# Patient Record
Sex: FEMALE | Race: WHITE | NOT HISPANIC OR LATINO | Employment: FULL TIME | URBAN - METROPOLITAN AREA
[De-identification: names, ages, dates, MRNs, and addresses within clinical notes are randomized per-mention and may not be internally consistent; named-entity substitution may affect disease eponyms.]

---

## 2017-12-27 ENCOUNTER — OCCUPATIONAL MEDICINE (OUTPATIENT)
Dept: URGENT CARE | Facility: CLINIC | Age: 37
End: 2017-12-27
Payer: COMMERCIAL

## 2017-12-27 ENCOUNTER — APPOINTMENT (OUTPATIENT)
Dept: RADIOLOGY | Facility: IMAGING CENTER | Age: 37
End: 2017-12-27
Attending: NURSE PRACTITIONER
Payer: COMMERCIAL

## 2017-12-27 VITALS
HEART RATE: 65 BPM | HEIGHT: 67 IN | RESPIRATION RATE: 16 BRPM | WEIGHT: 185 LBS | DIASTOLIC BLOOD PRESSURE: 82 MMHG | BODY MASS INDEX: 29.03 KG/M2 | OXYGEN SATURATION: 98 % | SYSTOLIC BLOOD PRESSURE: 116 MMHG | TEMPERATURE: 97.8 F

## 2017-12-27 DIAGNOSIS — S67.22XA CRUSHING INJURY OF LEFT HAND, INITIAL ENCOUNTER: ICD-10-CM

## 2017-12-27 DIAGNOSIS — Z02.1 PRE-EMPLOYMENT DRUG SCREENING: ICD-10-CM

## 2017-12-27 LAB
AMP AMPHETAMINE: NORMAL
COC COCAINE: NORMAL
INT CON NEG: NORMAL
INT CON POS: NORMAL
MET METHAMPHETAMINES: NORMAL
OPI OPIATES: NORMAL
PCP PHENCYCLIDINE: NORMAL
POC DRUG COMMENT 753798-OCCUPATIONAL HEALTH: NORMAL
THC: NORMAL

## 2017-12-27 PROCEDURE — 80305 DRUG TEST PRSMV DIR OPT OBS: CPT | Mod: 29 | Performed by: NURSE PRACTITIONER

## 2017-12-27 PROCEDURE — 99203 OFFICE O/P NEW LOW 30 MIN: CPT | Mod: 29 | Performed by: NURSE PRACTITIONER

## 2017-12-27 PROCEDURE — 73130 X-RAY EXAM OF HAND: CPT | Mod: TC,LT,29 | Performed by: NURSE PRACTITIONER

## 2017-12-27 ASSESSMENT — ENCOUNTER SYMPTOMS
RESPIRATORY NEGATIVE: 1
NEUROLOGICAL NEGATIVE: 1
CONSTITUTIONAL NEGATIVE: 1

## 2017-12-27 ASSESSMENT — PAIN SCALES - GENERAL: PAINLEVEL: 4=SLIGHT-MODERATE PAIN

## 2017-12-27 NOTE — LETTER
[facility]  [address], [address] [place] [address]  Phone:  [phone] - Fax:  [phone]   [facility] Progress Report and Disability Certification  Date of Service: 2017   No Show:  No  Date / Time of Next Visit: 2018   Claim Information   Patient Name: Virginia Guzman  Claim Number:     Employer:   [employer] Date of Injury: 2017     Insurer / TPA: Employers Insurance  ID / SSN: [id number]    Occupation:   Diagnosis: The encounter diagnosis was Crushing injury of left hand, initial encounter.    Medical Information   Related to Industrial Injury? Yes    Subjective Complaints:  DOI  - approx 1200 she got her smashed between boards and rail. She was wearing her gloves. No hx of same. Pain 5/10 and better with tylenol.      Objective Findings: TTP over second knuckle. Full ROM. Distal CMS intact    Pre-Existing Condition(s): Denies    Assessment:   Initial Visit    Status: Additional Care Required  Permanent Disability:No    Plan: Medication  Comments:tylenol     Diagnostics: X-ray  Comments:negative     Comments:       Disability Information   Status: Released to Restricted Duty    From:  2017  Through: 2018 Restrictions are: Temporary   Physical Restrictions   Sitting:    Standing:    Stooping:    Bending:      Squatting:    Walking:    Climbing:    Pushin hrs/day  Comments:left hand 2 # wt limit    Pullin hrs/day  Comments:left hand 2 # wt limit  Other:    Reaching Above Shoulder (L):   Reaching Above Shoulder (R):       Reaching Below Shoulder (L):    Reaching Below Shoulder (R):      Not to exceed Weight Limits   Carrying(hrs):   Weight Limit(lb):   Comments:left hand 2 # wt limit  Lifting(hrs):   Weight  Limit(lb):   Comments:left hand 2 # wt limit    Comments: 2# weight limit to left hand     Repetitive Actions   Hands: i.e. Fine Manipulations from Grasping:     Feet: i.e. Operating Foot Controls:        Driving / Operate Machinery:     Physician Name: QUINTEN Soliman Physician Signature: LIZY Eddy e-Signature: Dr. Kurt Guerra, Medical Director   Clinic Name / Location: [address], [address]  [place] [address] Clinic Phone Number: Dept: [phone]   Appointment Time: 2:30 Pm Visit Start Time: 2:57 PM   Check-In Time:  2:32 Pm Visit Discharge Time:  3:46pm   Original-Treating Physician or Chiropractor    Page 2-Insurer/TPA    Page 3-Employer    Page 4-Employee

## 2017-12-27 NOTE — LETTER
[facility]  [address] [place] [address]  Phone:  [phone] - Fax:  [phone]   [facility] Progress Report and Disability Certification  Date of Service: 2017   No Show:  No  Date / Time of Next Visit: 2018   Claim Information   Patient Name: Virginia Guzman  Claim Number:     Employer:   [employer] Date of Injury: 2017     Insurer / TPA: Employers Insurance  ID / SSN: [id number]    Occupation:   Diagnosis: The encounter diagnosis was Crushing injury of left hand, initial encounter.    Medical Information   Related to Industrial Injury? Yes    Subjective Complaints:  DOI  - approx 1200 she got her smashed between boards and rail. She was wearing her gloves. No hx of same. Pain 5/10 and better with tylenol.      Objective Findings: TTP over second knuckle. Full ROM. Distal CMS intact    Pre-Existing Condition(s): Denies    Assessment:   Initial Visit    Status: Discharged / Care Transfer  Permanent Disability:No    Plan: Medication  Comments:tylenol     Diagnostics: X-ray  Comments:negative     Comments:       Disability Information   Status: Released to Restricted Duty    From:  2017  Through: 2018 Restrictions are: Temporary   Physical Restrictions   Sitting:    Standing:    Stooping:    Bending:      Squatting:    Walking:    Climbing:    Pushin hrs/day  Comments:left hand 2 # wt limit    Pullin hrs/day  Comments:left hand 2 # wt limit  Other:    Reaching Above Shoulder (L):   Reaching Above Shoulder (R):       Reaching Below Shoulder (L):    Reaching Below Shoulder (R):      Not to exceed Weight Limits   Carrying(hrs):   Weight Limit(lb):   Comments:left hand 2 # wt limit  Lifting(hrs):   Weight  Limit(lb):   Comments:left hand 2 # wt limit    Comments: 2# weight limit to left hand     Repetitive Actions   Hands: i.e. Fine Manipulations from Grasping:     Feet: i.e. Operating Foot Controls:        Driving / Operate Machinery:     Physician Name: QUINTEN Soliman Physician Signature: LIZY Eddy e-Signature: Dr. Kurt Guerra, Medical Director   Clinic Name / Location: [address], [address]  [place] [address] Clinic Phone Number: Dept: [phone]   Appointment Time: 2:30 Pm Visit Start Time: 2:57 PM   Check-In Time:  2:32 Pm Visit Discharge Time: 1537   Original-Treating Physician or Chiropractor    Page 2-Insurer/TPA    Page 3-Employer    Page 4-Employee

## 2017-12-27 NOTE — LETTER
"EMPLOYEE’S CLAIM FOR COMPENSATION/ REPORT OF INITIAL TREATMENT  FORM C-4    EMPLOYEE’S CLAIM - PROVIDE ALL INFORMATION REQUESTED   First Name  Virginia Last Name  Thomas Birthdate                    1980                Sex  female Claim Number   Home Address  [address] Age  37 y.o. Height  1.702 m (5' 7\") Weight  83.9 kg (185 lb) N  [id number]   [facility] Zip  [address] Telephone  [phone] (home)    Mailing Address  [address] [facility] Zip  [address] Primary Language Spoken  English    Insurer   Third Party   Employers Insurance   Employee's Occupation (Job Title) When Injury or Occupational Disease Occurred      Employer's Name    [employer] Telephone      Employer Address   [address] Zip   [address]   Date of Injury  12/27/2017               Hour of Injury  12:30 PM Date Employer Notified  12/27/2017 Last Day of Work after Injury or Occupational Disease  12/27/2017 Supervisor to Whom Injury Reported  Deanna Sauceda   Address or Location of Accident (if applicable)  [[employer]]   What were you doing at the time of accident? (if applicable)  Loading Trash into Compactor    How did this injury or occupational disease occur? (Be specific an answer in detail. Use additional sheet if necessary)  Wooden Board Repelled from compactor,struck left hand crushing hand onto compactor   If you believe that you have an occupational disease, when did you first have knowledge of the disability and it relationship to your employment?  no Witnesses to the Accident  [place]      Nature of Injury or Occupational Disease  Workers' Compensation  Part(s) of Body Injured or Affected  Hand (L), ,     I certify that the above is true and correct to the best of my knowledge and that I have provided this information in order to obtain the benefits of [place]’s " [place] or [place] of [place]).  I hereby authorize any physician, chiropractor, surgeon, practitioner, or other person, any hospital, including [facility] or [facility], any medical service organization, any insurance company, or other institution or organization to release to each other, any medical or other information, including benefits paid or payable, pertinent to this injury or disease, except information relative to diagnosis, treatment and/or counseling for AIDS, psychological conditions, alcohol or controlled substances, for which I must give specific authorization.  A Photostat of this authorization shall be as valid as the original.     Date 12/27/2017   [facility]   Employee’s Signature   THIS REPORT MUST BE COMPLETED AND MAILED WITHIN 3 WORKING DAYS OF TREATMENT   Place  [facility]  Name of Facility  [facility]   Date  12/27/2017 Diagnosis  (S67.22XA) Crushing injury of left hand, initial encounter Is there evidence the injured employee was under the influence of alcohol and/or another controlled substance at the time of accident?   Hour  2:57 PM Description of Injury or Disease  The encounter diagnosis was Crushing injury of left hand, initial encounter.     Treatment  Ice and tylenol   Have you advised the patient to remain off work five days or more? No   X-Ray Findings  Negative   If Yes   From Date  To Date      From information given by the employee, together with medical evidence, can you directly connect this injury or occupational disease as job incurred?  Yes If No Full Duty  No Modified Duty  Yes   Is additional medical care by a physician indicated?  Yes If Modified Duty, Specify any Limitations / Restrictions  2 # weight limit left hand    Do you know of any previous injury or disease contributing to this condition or occupational disease?                      "       No   Date  12/27/2017 Print Doctor’s Name QUINTEN Soliman I certify the employer’s copy of  this form was mailed on:   Address  [address], [address] Insurer’s Use Only     [facility] [place] [facility] [place] [facility] [place] Zip  [address]    Provider’s Tax ID Number  [id number] Telephone  Dept: [phone]        martine-[email or web address] Anguiano   e-Signature: Dr. Kurt Guerra, Medical Director Degree  [employer]        ORIGINAL-TREATING PHYSICIAN OR CHIROPRACTOR    PAGE 2-INSURER/TPA    PAGE 3-EMPLOYER    PAGE 4-EMPLOYEE             Form C-4 (rev10/07)              BRIEF DESCRIPTION OF RIGHTS AND BENEFITS  (Pursuant to [place] 616C.050)    Notice of Injury or Occupational Disease (Incident Report Form C-1): If an injury or occupational disease (OD) arises out of and in the  course of employment, you must provide written notice to your employer as soon as practicable, but no later than 7 days after the accident or  OD. Your employer shall maintain a sufficient supply of the required forms.    Claim for Compensation (Form C-4): If medical treatment is sought, the form C-4 is available at the place of initial treatment. A completed  \"Claim for Compensation\" (Form C-4) must be filed within 90 days after an accident or OD. The treating physician or chiropractor must,  within 3 working days after treatment, complete and mail to the employer, the employer's insurer and third-party , the Claim for  Compensation.    Medical Treatment: If you require medical treatment for your on-the-job injury or OD, you may be required to select a physician or  chiropractor from a list provided by your workers’ compensation insurer, if it has contracted with an Organization for Managed Care (MCO) or  Preferred Provider Organization (PPO) or providers of health care. If your employer has not entered into a contract with an MCO or PPO, you  may select a physician or chiropractor from the Panel of Physicians and Chiropractors. Any medical costs " related to your industrial injury or  OD will be paid by your insurer.    Temporary Total Disability (TTD): If your doctor has certified that you are unable to work for a period of at least 5 consecutive days, or 5  cumulative days in a 20-day period, or places restrictions on you that your employer does not accommodate, you may be entitled to TTD  compensation.    Temporary Partial Disability (TPD): If the wage you receive upon reemployment is less than the compensation for TTD to which you are  entitled, the insurer may be required to pay you TPD compensation to make up the difference. TPD can only be paid for a maximum of 24  months.    Permanent Partial Disability (PPD): When your medical condition is stable and there is an indication of a PPD as a result of your injury or  OD, within 30 days, your insurer must arrange for an evaluation by a rating physician or chiropractor to determine the degree of your PPD. The  amount of your PPD award depends on the date of injury, the results of the PPD evaluation and your age and wage.    Permanent Total Disability (PTD): If you are medically certified by a treating physician or chiropractor as permanently and totally disabled  and have been granted a PTD status by your insurer, you are entitled to receive monthly benefits not to exceed 66 2/3% of your average  monthly wage. The amount of your PTD payments is subject to reduction if you previously received a PPD award.    Vocational Rehabilitation Services: You may be eligible for vocational rehabilitation services if you are unable to return to the job due to a  permanent physical impairment or permanent restrictions as a result of your injury or occupational disease.    Transportation and Per Pili Reimbursement: You may be eligible for travel expenses and per pili associated with medical treatment.    Reopening: You may be able to reopen your claim if your condition worsens after claim closure.    Appeal Process: If you  disagree with a written determination issued by the insurer or the insurer does not respond to your request, you may  appeal to the Department of Administration, , by following the instructions contained in your determination letter. You must  appeal the determination within 70 days from the date of the determination letter at [address], [address], [place], [place]  [address], or [address], [address], [place], [place] [address]. If you disagree with the  decision, you may appeal to the  Department of Administration, . You must file your appeal within 30 days from the date of the  decision  letter at [address], [address], [place], [place] [address], or [address], [address], [place], [place] [address]. If you  disagree with a decision of an , you may file a petition for judicial review with the District Court. You must do so within 30  days of the Appeal Officer’s decision. You may be represented by an  at your own expense or you may contact the [facility] for possible  representation.    [place]  for Injured Workers (NAIW): If you disagree with a  decision, you may request that NAIW represent you  without charge at an  Hearing. For information regarding denial of benefits, you may contact the [facility] at: [address], [address], [place] [address], [phone], or [address], [address], [place] [address], [phone]    To File a Complaint with the Division: If you wish to file a complaint with the  of the Division of Industrial Relations (DIR),  please contact the Workers’ Compensation Section, [address], [address], [place], [place] [address], telephone [phone], or  [address], [address], [place], [place] [address], telephone [phone].    For assistance with Workers’ Compensation  Issues: you may contact the Office of the Governor Consumer Health Assistance, [facility] [address], [address], [place] [address], Toll Free 1-505.704.9058, Web site: http://govcha.[facility].[place]., E-mail  [email or web address].                                                                                                                                                                                                                                   __________________________________________________________________                                                                   __12/27/2017_______________                Employee Name / Signature                                                                                                                                                       Date                                                                                                                                                                                                     D-2 (rev. 10/07)

## 2017-12-27 NOTE — PROGRESS NOTES
"Subjective:      Virginia Guzman is a 37 y.o. female who presents with Hand Pain (left hand pain since crush injury today )  Denies previous medical, surgical, or family history. Denies medications or allergies.      DOI 12/27 - approx 1200 she got her smashed between boards and rail. She was wearing her gloves. No hx of same. Pain 5/10 and better with tylenol.        HPI  Chief Complaint   Patient presents with   • Hand Pain     left hand pain since crush injury today    as above     Review of Systems   Constitutional: Negative.    HENT: Negative.    Respiratory: Negative.    Musculoskeletal:        Left hand pain    Skin: Negative.    Neurological: Negative.    All other systems reviewed and are negative.         Objective:     /82   Pulse 65   Temp 36.6 °C (97.8 °F)   Resp 16   Ht 1.702 m (5' 7\")   Wt 83.9 kg (185 lb)   SpO2 98%   BMI 28.98 kg/m²      Physical Exam   Constitutional: She is oriented to person, place, and time. She appears well-developed and well-nourished. No distress.   Neck: Normal range of motion.   Pulmonary/Chest: Effort normal. No respiratory distress.   Musculoskeletal:   See below for focused assessment    Neurological: She is alert and oriented to person, place, and time.   Skin: Skin is warm and dry. Capillary refill takes less than 2 seconds.   Psychiatric: She has a normal mood and affect.   Nursing note and vitals reviewed.      TTP over second knuckle. Full ROM. Distal CMS intact     Hand xray negative      Assessment/Plan:     1. Crushing injury of left hand, initial encounter  DX-HAND 3+ LEFT     Ice  Tylenol  Gentle ROM  Light duty per D 39  Follow up 1/2 - anticipate d'c mmi     Please make an appointment for follow up with your primary care provider. Return for any change in condition, worsening of symptoms, or any other concerns. Please go to the nearest emergency room for any shortness of breath or chest pain or for any of the emergent conditions we have discussed. " Patient states understanding to plan of care and discharge instructions.    Please note that this dictation was created using voice recognition software. I have worked with consultants from the vendor as well as technical experts from [facility] to optimize the interface. I have made every reasonable attempt to correct obvious errors, but I expect that there are errors of grammar and possibly content that I did not discover before finalizing the note.

## 2018-01-02 ENCOUNTER — NON-PROVIDER VISIT (OUTPATIENT)
Dept: OCCUPATIONAL MEDICINE | Facility: CLINIC | Age: 38
End: 2018-01-02
Payer: COMMERCIAL

## 2018-01-02 DIAGNOSIS — Z02.83 ENCOUNTER FOR DRUG SCREENING: ICD-10-CM

## 2018-01-02 PROCEDURE — 8911 PR MRO FEE: Performed by: INTERNAL MEDICINE

## 2018-01-16 NOTE — PROGRESS NOTES
Non-conclusive UDS follow up.  MRO confirmation fees need to be charged.  MRO report date 1/2/18

## 2018-03-06 ENCOUNTER — NON-PROVIDER VISIT (OUTPATIENT)
Dept: URGENT CARE | Facility: CLINIC | Age: 38
End: 2018-03-06

## 2018-03-06 ENCOUNTER — OFFICE VISIT (OUTPATIENT)
Dept: URGENT CARE | Facility: CLINIC | Age: 38
End: 2018-03-06

## 2018-03-06 DIAGNOSIS — Z29.89 NEED FOR ISOLATION: ICD-10-CM

## 2018-03-06 DIAGNOSIS — Z01.89 RESPIRATORY CLEARANCE EXAMINATION, ENCOUNTER FOR: ICD-10-CM

## 2018-03-06 PROCEDURE — 94375 RESPIRATORY FLOW VOLUME LOOP: CPT | Performed by: NURSE PRACTITIONER

## 2018-03-06 PROCEDURE — 94010 BREATHING CAPACITY TEST: CPT | Performed by: NURSE PRACTITIONER

## 2018-03-07 NOTE — PROGRESS NOTES
Virginia Guzman is a 38 y.o. female here for a non-provider visit for Mask Fit/ Respiratory Clearance    If abnormal was an in office provider notified today (if so, indicate provider)? Yes  Routed to PCP? No

## 2019-07-29 ENCOUNTER — OFFICE VISIT (OUTPATIENT)
Dept: URGENT CARE | Facility: PHYSICIAN GROUP | Age: 39
End: 2019-07-29

## 2019-07-29 VITALS
SYSTOLIC BLOOD PRESSURE: 116 MMHG | OXYGEN SATURATION: 100 % | RESPIRATION RATE: 18 BRPM | DIASTOLIC BLOOD PRESSURE: 74 MMHG | HEART RATE: 74 BPM | TEMPERATURE: 98.7 F

## 2019-07-29 DIAGNOSIS — S39.012A LOW BACK STRAIN, INITIAL ENCOUNTER: ICD-10-CM

## 2019-07-29 PROCEDURE — 99214 OFFICE O/P EST MOD 30 MIN: CPT | Performed by: PHYSICIAN ASSISTANT

## 2019-07-29 RX ORDER — PREDNISONE 10 MG/1
40 TABLET ORAL DAILY
Qty: 20 TAB | Refills: 0 | Status: SHIPPED | OUTPATIENT
Start: 2019-07-29 | End: 2019-08-03

## 2019-07-29 RX ORDER — METHOCARBAMOL 500 MG/1
500 TABLET, FILM COATED ORAL 3 TIMES DAILY PRN
Qty: 30 TAB | Refills: 0 | Status: SHIPPED | OUTPATIENT
Start: 2019-07-29 | End: 2019-10-17

## 2019-07-29 ASSESSMENT — ENCOUNTER SYMPTOMS
PARESTHESIAS: 0
TINGLING: 1
ABDOMINAL PAIN: 0
BOWEL INCONTINENCE: 0
NUMBNESS: 0
SENSORY CHANGE: 0
FOCAL WEAKNESS: 0
PARESIS: 0
BACK PAIN: 1

## 2019-07-29 NOTE — PROGRESS NOTES
Subjective:   Virginia Guzman is a 39 y.o. female who presents today with   Chief Complaint   Patient presents with   • Back Pain     After pulling weeds at home        Back Pain   This is a new problem. Episode onset: 3 days. The problem occurs constantly. The problem is unchanged. The pain is present in the lumbar spine. The quality of the pain is described as aching, cramping and shooting. The pain radiates to the left thigh. The pain is moderate. The symptoms are aggravated by bending, position and standing. Associated symptoms include tingling. Pertinent negatives include no abdominal pain, bladder incontinence, bowel incontinence, numbness, paresis or paresthesias. She has tried NSAIDs for the symptoms. The treatment provided mild relief.     Patient denies any recent trauma.  She does not have any history of back injury.  She does state that she was pulling weeds over the weekend and is when she started noticing her back pain.  PMH:  has no past medical history on file.  MEDS:   Current Outpatient Prescriptions:   •  methocarbamol (ROBAXIN) 500 MG Tab, Take 1 Tab by mouth 3 times a day as needed (muscle spasm)., Disp: 30 Tab, Rfl: 0  •  predniSONE (DELTASONE) 10 MG Tab, Take 4 Tabs by mouth every day for 5 days., Disp: 20 Tab, Rfl: 0  ALLERGIES: No Known Allergies  SURGHX: History reviewed. No pertinent surgical history.  SOCHX:  reports that she has been smoking.  She has never used smokeless tobacco.  FH: Reviewed with patient, not pertinent to this visit.       Review of Systems   Gastrointestinal: Negative for abdominal pain and bowel incontinence.   Genitourinary: Negative for bladder incontinence.   Musculoskeletal: Positive for back pain.   Neurological: Positive for tingling. Negative for sensory change, focal weakness, numbness and paresthesias.   All other systems reviewed and are negative.       Objective:   /74   Pulse 74   Temp 37.1 °C (98.7 °F) (Temporal)   Resp 18   SpO2 100%      Physical Exam   Constitutional: She appears well-developed and well-nourished. No distress.   HENT:   Head: Normocephalic and atraumatic.   Right Ear: Hearing normal.   Left Ear: Hearing normal.   Eyes: Pupils are equal, round, and reactive to light.   Cardiovascular: Normal rate, regular rhythm and normal heart sounds.    Pulmonary/Chest: Effort normal and breath sounds normal.   Musculoskeletal:        Lumbar back: She exhibits decreased range of motion, tenderness, bony tenderness and spasm.        Back:    Patient has tenderness to palpation of the left lumbar region.  No point bony tenderness along the spine but just lateral to the spine there is tenderness and tightness in the paraspinous muscles   Neurological: She is alert. Coordination normal.   Skin: Skin is warm and dry.   Psychiatric: She has a normal mood and affect.   Nursing note and vitals reviewed.    Patient can ambulate upon examination but is unable to straighten her back out secondary to pain.  Patient has normal distal sensation and is neurovascularly intact.    Assessment/Plan:   Assessment    1. Low back strain, initial encounter  - methocarbamol (ROBAXIN) 500 MG Tab; Take 1 Tab by mouth 3 times a day as needed (muscle spasm).  Dispense: 30 Tab; Refill: 0  - predniSONE (DELTASONE) 10 MG Tab; Take 4 Tabs by mouth every day for 5 days.  Dispense: 20 Tab; Refill: 0  Patient is requesting a note for work. Patient to only take muscle relaxer as needed and to not operate any vehicles after taking the medication.  Patient should use heat or ice on the area and gentle massages.  Differential diagnosis, natural history, supportive care, and indications for immediate follow-up discussed.   Patient given instructions and understanding of medications and treatment.    If not improving in 3-5 days, F/U with PCP or return to  if symptoms worsen.    Patient agreeable to plan.      Please note that this dictation was created using voice recognition  software. I have made every reasonable attempt to correct obvious errors, but I expect that there are errors of grammar and possibly content that I did not discover before finalizing the note.    Ranjan Campos PA-C

## 2019-07-29 NOTE — LETTER
July 29, 2019         Patient: Virginia Guzman   YOB: 1980   Date of Visit: 7/29/2019           To Whom it May Concern:    Virginia Guzman was seen in my clinic on 7/29/2019. She can return to work 8/5/19.  Please excuse her recent absence.  If you have any questions or concerns, please don't hesitate to call.        Sincerely,           Ranjan Campos P.A.-C.  Electronically Signed

## 2019-08-05 ENCOUNTER — OFFICE VISIT (OUTPATIENT)
Dept: URGENT CARE | Facility: PHYSICIAN GROUP | Age: 39
End: 2019-08-05

## 2019-08-05 VITALS — HEART RATE: 80 BPM | RESPIRATION RATE: 16 BRPM | TEMPERATURE: 98.4 F | OXYGEN SATURATION: 97 %

## 2019-08-05 DIAGNOSIS — S39.012A ACUTE MYOFASCIAL STRAIN OF LUMBAR REGION, INITIAL ENCOUNTER: ICD-10-CM

## 2019-08-05 PROCEDURE — 99214 OFFICE O/P EST MOD 30 MIN: CPT | Performed by: PHYSICIAN ASSISTANT

## 2019-08-05 RX ORDER — PREDNISONE 10 MG/1
TABLET ORAL
Qty: 1 QUANTITY SUFFICIENT | Refills: 0 | Status: SHIPPED | OUTPATIENT
Start: 2019-08-05 | End: 2019-10-17

## 2019-08-05 RX ORDER — CYCLOBENZAPRINE HCL 10 MG
10 TABLET ORAL 3 TIMES DAILY PRN
Qty: 30 TAB | Refills: 0 | Status: SHIPPED | OUTPATIENT
Start: 2019-08-05 | End: 2019-10-17

## 2019-08-05 ASSESSMENT — PAIN SCALES - GENERAL: PAINLEVEL: 4=SLIGHT-MODERATE PAIN

## 2019-08-05 NOTE — LETTER
August 5, 2019         Patient: Virginia Guzman   YOB: 1980   Date of Visit: 8/5/2019           To Whom it May Concern:    Virginia Guzman was seen in my clinic on 8/5/2019. She may return to work on 8/15/19.    If you have any questions or concerns, please don't hesitate to call.        Sincerely,           Dayanna Saldivar P.A.-C.  Electronically Signed

## 2019-08-05 NOTE — PROGRESS NOTES
"Chief Complaint   Patient presents with   • Back Pain     follow up from last week visit       HISTORY OF PRESENT ILLNESS: Patient is a 39 y.o. female who presents today for the following:    Patient comes in with her mother for evaluation of back pain.  She was seen for the same 7/29/2019 after pulling weeds.  She was given prednisone and methocarbamol.  She states she was starting to feel a lot better with the prednisone but only had 4 days worth.  Her pain returned after stopping the prednisone.  She complains of \"an icy hot pain\" down the posterior aspect of her left buttock and left lower extremity, stopping at the left foot.  Patient continues to deny saddle anesthesia, bowel/bladder incontinence, and extremity weakness.  She is having a difficult time sleeping due to the pain.  She has been taking a hot shower which seems to help and then icing after that.  She feels that the methocarbamol has not been helping.  She has been alternating ibuprofen and acetaminophen every 4 hours.  She works at [employer] and states she needs a note to miss work.  She appears frustrated because she does not yet have insurance despite being there for over a year.  She has an alternating schedule and is asking to go back to work 8/15.  Patient understands that [facility] paperwork will not be filled out in the urgent care.  She is requesting a referral to physical therapy as well.    There are no active problems to display for this patient.      Allergies:Patient has no known allergies.    Current Outpatient Medications Ordered in Epic   Medication Sig Dispense Refill   • predniSONE (DELTASONE) 10 MG Tab 50 mg x 1 day; 40 mg x 2 days; 30 mg x 2 days; 20 mg x 1 day; 10 mg x 1 day 1 Quantity Sufficient 0   • cyclobenzaprine (FLEXERIL) 10 MG Tab Take 1 Tab by mouth 3 times a day as needed for Mild Pain or Moderate Pain. 30 Tab 0   • methocarbamol (ROBAXIN) 500 MG Tab Take 1 Tab by mouth 3 times a day as needed (muscle spasm). (Patient not " taking: Reported on 8/5/2019) 30 Tab 0     No current Epic-ordered facility-administered medications on file.        No past medical history on file.    Social History     Tobacco Use   • Smoking status: Current Every Day Smoker   • Smokeless tobacco: Never Used   Substance Use Topics   • Alcohol use: Not on file   • Drug use: Not on file       No family status information on file.   No family history on file.    Review of Systems:    Constitutional ROS: No unexpected change in weight, No weakness, No fatigue  Eye ROS: No recent significant change in vision, No eye pain, redness, discharge  Ear ROS: No drainage, No tinnitus or vertigo, No recent change in hearing  Mouth/Throat ROS: No teeth or gum problems, No bleeding gums, No tongue complaints  Neck ROS: No swollen glands, No significant pain in neck  Pulmonary ROS: No chronic cough, sputum, or hemoptysis, No dyspnea on exertion, No wheezing  Cardiovascular ROS: No diaphoresis, No edema, No palpitations  Gastrointestinal ROS: No change in bowel habits, No significant change in appetite, No nausea, vomiting, diarrhea, or constipation  Musculoskeletal/Extremities ROS: Positive for back pain.  Hematologic/Lymphatic ROS: No chills, No night sweats, No weight loss  Skin/Integumentary ROS: No edema, No evidence of rash, No itching      Exam:  Pulse 80   Temp 36.9 °C (98.4 °F) (Temporal)   Resp 16   SpO2 97%   General: Well developed, well nourished. No distress.  Pulmonary: Unlabored respiratory effort.  Back: Decreased range of motion in all planes due to pain.  Patient is sitting upright, very stiffly.  No localized tenderness noted but patient points to the left lumbar region.  Extremities: No motor or sensory deficit noted.  Prepatellar DTRs are strong and equal bilaterally.  Strong plantar flexion/dorsiflexion bilaterally.  Neurologic: Grossly nonfocal. No facial asymmetry noted.  Skin: Warm, dry, good turgor. No rashes in visible areas.   Psych: Normal mood.  Alert and oriented x3. Judgment and insight is normal.    Assessment/Plan:  We will repeat one course of steroids.  Stop methocarbamol and start cycle Benzapril.  Discussed appropriate dosing of ibuprofen, acetaminophen, and adding heat/ice and over-the-counter muscle rubs.  Do not use any anti-inflammatories with prednisone.  Referring patient to physical therapy per patient request.  Discussed red flags and ER precautions.  1. Acute myofascial strain of lumbar region, initial encounter  predniSONE (DELTASONE) 10 MG Tab    REFERRAL TO PHYSICAL THERAPY Reason for Therapy: Eval/Treat/Report    cyclobenzaprine (FLEXERIL) 10 MG Tab

## 2019-10-17 ENCOUNTER — APPOINTMENT (OUTPATIENT)
Dept: RADIOLOGY | Facility: IMAGING CENTER | Age: 39
End: 2019-10-17
Attending: FAMILY MEDICINE

## 2019-10-17 ENCOUNTER — OCCUPATIONAL MEDICINE (OUTPATIENT)
Dept: URGENT CARE | Facility: PHYSICIAN GROUP | Age: 39
End: 2019-10-17

## 2019-10-17 VITALS
WEIGHT: 193 LBS | DIASTOLIC BLOOD PRESSURE: 82 MMHG | SYSTOLIC BLOOD PRESSURE: 120 MMHG | HEART RATE: 93 BPM | HEIGHT: 67 IN | BODY MASS INDEX: 30.29 KG/M2 | RESPIRATION RATE: 16 BRPM | OXYGEN SATURATION: 98 % | TEMPERATURE: 98.3 F

## 2019-10-17 DIAGNOSIS — Z02.1 PHYSICAL EXAM, PRE-EMPLOYMENT: ICD-10-CM

## 2019-10-17 DIAGNOSIS — Z02.1 PRE-EMPLOYMENT DRUG SCREENING: ICD-10-CM

## 2019-10-17 DIAGNOSIS — Z02.1 PHYSICAL EXAM, PRE-EMPLOYMENT: Primary | ICD-10-CM

## 2019-10-17 LAB
AMP AMPHETAMINE: NORMAL
COC COCAINE: NORMAL
INT CON NEG: NORMAL
INT CON POS: NORMAL
MET METHAMPHETAMINES: NORMAL
OPI OPIATES: NORMAL
PCP PHENCYCLIDINE: NORMAL
POC DRUG COMMENT 753798-OCCUPATIONAL HEALTH: NEGATIVE
THC: NORMAL

## 2019-10-17 PROCEDURE — 8915 PR COMPREHENSIVE PHYSICAL: Performed by: FAMILY MEDICINE

## 2019-10-17 PROCEDURE — 71045 X-RAY EXAM CHEST 1 VIEW: CPT | Mod: TC,FY | Performed by: FAMILY MEDICINE

## 2019-10-17 PROCEDURE — 80305 DRUG TEST PRSMV DIR OPT OBS: CPT | Performed by: FAMILY MEDICINE

## 2019-10-17 PROCEDURE — 92552 PURE TONE AUDIOMETRY AIR: CPT | Performed by: FAMILY MEDICINE

## 2019-10-17 NOTE — PROGRESS NOTES
Chief Complaint:    Chief Complaint   Patient presents with   • Employment Physical     [employer] pre-employment physical/audio clearance       History of Present Illness:    Here for pre-employment exam for [employer] as a .      Review of Systems:    Constitutional: Negative for fever, chills, and diaphoresis.   Eyes: Negative for change in vision, photophobia, pain, redness, and discharge.  ENT: Negative for ear pain, ear discharge, hearing loss, tinnitus, nasal congestion, nosebleeds, and sore throat.    Respiratory: Negative for cough, hemoptysis, sputum production, shortness of breath, wheezing, and stridor.    Cardiovascular: Negative for chest pain, palpitations, orthopnea, claudication, leg swelling, and PND.   Gastrointestinal: Negative for abdominal pain, nausea, vomiting, diarrhea, constipation, blood in stool, and melena.   Genitourinary: Negative for dysuria, urinary urgency, urinary frequency, hematuria, and flank pain.   Musculoskeletal: Negative for myalgias, joint pain, neck pain, and back pain.   Skin: Negative for rash and itching.   Neurological: Negative for dizziness, tingling, tremors, sensory change, speech change, focal weakness, seizures, loss of consciousness, and headaches.   Endo: Negative for polydipsia.   Heme: Does not bruise/bleed easily.   Psychiatric/Behavioral: Negative for depression, suicidal ideas, hallucinations, memory loss and substance abuse. The patient is not nervous/anxious and does not have insomnia.        Past Medical History:    History reviewed. No pertinent past medical history.    Past Surgical History:    Past Surgical History:   Procedure Laterality Date   • PRIMARY C SECTION       Social History:    Social History     Socioeconomic History   • Marital status: Single     Spouse name: Not on file   • Number of children: Not on file   • Years of education: Not on file   • Highest education level: Not on file   Occupational History   • Not on file   Social Needs  "  • Financial resource strain: Not on file   • Food insecurity:     Worry: Not on file     Inability: Not on file   • Transportation needs:     Medical: Not on file     Non-medical: Not on file   Tobacco Use   • Smoking status: Current Every Day Smoker   • Smokeless tobacco: Never Used   Substance and Sexual Activity   • Alcohol use: Not on file   • Drug use: Not on file   • Sexual activity: Not on file   Lifestyle   • Physical activity:     Days per week: Not on file     Minutes per session: Not on file   • Stress: Not on file   Relationships   • Social connections:     Talks on phone: Not on file     Gets together: Not on file     Attends Jewish service: Not on file     Active member of club or organization: Not on file     Attends meetings of clubs or organizations: Not on file     Relationship status: Not on file   • Intimate partner violence:     Fear of current or ex partner: Not on file     Emotionally abused: Not on file     Physically abused: Not on file     Forced sexual activity: Not on file   Other Topics Concern   • Not on file   Social History Narrative   • Not on file     Family History:    History reviewed. No pertinent family history.    Medications:    No current outpatient medications on file prior to visit.     No current facility-administered medications on file prior to visit.      Allergies:    No Known Allergies      Vitals:    Vitals:    10/17/19 1315   BP: 120/82   Pulse: 93   Resp: 16   Temp: 36.8 °C (98.3 °F)   SpO2: 98%   Weight: 87.5 kg (193 lb)   Height: 1.702 m (5' 7\")     Vision: 20/20 each eye, uncorrected.      Physical Exam:    Constitutional: Vital signs reviewed. Appears well-developed and well-nourished. No acute distress.   Eyes: Sclera white, conjunctivae clear. PERRLA.  ENT: External ears normal. External auditory canals normal without discharge. TMs translucent and non-bulging. Hearing grossly normal. Nasal mucosa pink. Lips/teeth are normal. Oral mucosa pink and moist. " Posterior pharynx: WNL.  Neck: Neck supple.   Cardiovascular: Regular rate and rhythm. No murmur. No edema. No varicosities. Peripheral pulses 2+.  Pulmonary/Chest: Respirations non-labored. Clear to auscultation bilaterally.  Abdomen: Bowel sounds are normal active. Soft, non-distended, and non-tender to palpation. No hepatosplenomegaly.   Lymph: Cervical nodes without tenderness or enlargement.  Musculoskeletal: Normal gait. Normal range of motion. No tenderness to palpation. No muscular atrophy or weakness.  Neurological: Alert and oriented to person, place, and time. CN 2-12 intact. Muscle tone normal. Coordination normal. Light touch and sensation normal. Reflexes 2+.  Skin: No rashes or lesions. Warm, dry, normal turgor.  Psychiatric: Normal mood and affect. Behavior is normal. Judgment and thought content normal.       Diagnostics:    DX-CHEST-LIMITED (1 VIEW)   Order: [id number]   Status:  Final result   Visible to patient:  No (Not Released) Next appt:  None Dx:  Physical exam, pre-employment   Details     Reading Physician Reading Date Result Priority   Seven Huffman M.D. 10/17/2019 Urgent Care      Narrative       10/17/2019 1:36 PM    HISTORY/REASON FOR EXAM:  [employer] pre-employment physical.  Preemployment physical examination    TECHNIQUE/EXAM DESCRIPTION AND NUMBER OF VIEWS:  Single AP view of the chest.    COMPARISON:  None    FINDINGS:  The lungs are clear.    Cardiac Silhouette: normal in size.    Pleura: There are no pleural effusion or pneumothoraces.    Osseous structures: No significant bony abnormality is present.      Impression       Negative single view of the chest.             Audiogram: mild hearing loss right ear at high frequencies, o/w WNL.      Assessment / Plan:    1. Physical exam, pre-employment  - DX-CHEST-LIMITED (1 VIEW); Future      Patient appears to be in a state of good health and is physically able to perform essential functions of the job title above without  accommodations.    Employee is able to perform the essential functions of job as it pertains to the audiogram with accomodations - advised to wear hearing protection around loud noises.    Form completed.

## 2019-11-21 ENCOUNTER — OCCUPATIONAL MEDICINE (OUTPATIENT)
Dept: URGENT CARE | Facility: PHYSICIAN GROUP | Age: 39
End: 2019-11-21
Payer: COMMERCIAL

## 2019-11-21 VITALS
WEIGHT: 195.8 LBS | HEART RATE: 76 BPM | BODY MASS INDEX: 30.73 KG/M2 | TEMPERATURE: 98.1 F | OXYGEN SATURATION: 98 % | RESPIRATION RATE: 16 BRPM | DIASTOLIC BLOOD PRESSURE: 72 MMHG | HEIGHT: 67 IN | SYSTOLIC BLOOD PRESSURE: 110 MMHG

## 2019-11-21 DIAGNOSIS — S05.91XA RIGHT EYE INJURY, INITIAL ENCOUNTER: ICD-10-CM

## 2019-11-21 DIAGNOSIS — H57.8A9 SENSATION OF FOREIGN BODY IN EYE: ICD-10-CM

## 2019-11-21 PROCEDURE — 99214 OFFICE O/P EST MOD 30 MIN: CPT | Mod: 29 | Performed by: NURSE PRACTITIONER

## 2019-11-21 RX ORDER — ERYTHROMYCIN 5 MG/G
1 OINTMENT OPHTHALMIC 4 TIMES DAILY
Qty: 1 TUBE | Refills: 0 | Status: SHIPPED | OUTPATIENT
Start: 2019-11-21 | End: 2019-11-28

## 2019-11-21 ASSESSMENT — ENCOUNTER SYMPTOMS
EYE PAIN: 1
BLURRED VISION: 0
DOUBLE VISION: 0
HEADACHES: 0
DIZZINESS: 0

## 2019-11-21 ASSESSMENT — VISUAL ACUITY: OU: 1

## 2019-11-21 NOTE — LETTER
"   [facility] [place] [place] [address]  Phone:  [phone] - Fax:  [phone]   [facility] Progress Report and Disability Certification  Date of Service: 11/21/2019   No Show:  No  Date / Time of Next Visit: 11/22/2019   Claim Information   Patient Name: Virginia Guzman  Claim Number:     Employer: [employer]  Date of Injury: 11/21/2019     Insurer / TPA: Gonzalo Messina [facility]  ID / SSN:     Occupation: PT  Diagnosis: Diagnoses of Right eye injury, initial encounter and Sensation of foreign body in eye were pertinent to this visit.    Medical Information   Related to Industrial Injury? Yes    Subjective Complaints:  DOI: 11/21/2019. Was cleaning shavings from a  when she got down form the platform someone was using a blower to clean the area. A cloud of wood dust, poly, dirt went in to her  face. She felt something was in her right eye. She rinsed her eyes and face, eye wash station helped some. Feels like top of her eyelid is scratched. \"Eyeball feels fine\".  Stings with blinking. No pain, more of an eye discomfort. No visual changes. No contact use, wears glasses for reading. No second job.    Objective Findings: Eyes:      General: Lids are normal. Lids are everted, no foreign bodies appreciated. Vision grossly intact.      Extraocular Movements: Extraocular movements intact.      Right eye: Normal extraocular motion and no nystagmus.      Conjunctiva/sclera: Conjunctivae normal.      Right eye: Right conjunctiva is not injected. No chemosis, exudate or hemorrhage.     Pupils: Pupils are equal, round, and reactive to light.      Right eye: No corneal abrasion or fluorescein uptake.      Comments: Flushed with normal saline. No fluorescein uptake noted with woods lamp exam.         Pre-Existing Condition(s): None known.   Assessment:   Initial Visit    Status: Additional Care Required  Permanent Disability:No    Plan: Medication    "   Diagnostics:      Comments:       Disability Information   Status: Released to Full Duty    From:  11/21/2019  Through: 11/22/2019 Restrictions are:     Physical Restrictions   Sitting:    Standing:    Stooping:    Bending:      Squatting:    Walking:    Climbing:    Pushing:      Pulling:    Other:    Reaching Above Shoulder (L):   Reaching Above Shoulder (R):       Reaching Below Shoulder (L):    Reaching Below Shoulder (R):      Not to exceed Weight Limits   Carrying(hrs):   Weight Limit(lb):   Lifting(hrs):   Weight  Limit(lb):     Comments: - erythromycin 5 MG/GM Ointment; Place 1 Application in right eye 4 times a day for 7 days.  Dispense: 1 Tube; Refill: 0  -Eye irrigation, eye wash station 5 minutes.   -Follow up in 24 hours.  -OTC ibuprofen  -Emergently for eye pain or vision changes.     Repetitive Actions   Hands: i.e. Fine Manipulations from Grasping:     Feet: i.e. Operating Foot Controls:     Driving / Operate Machinery:     Physician Name: DEANNE Munoz Physician Signature: IMTIAZ Edward e-Signature: Dr. Craig Ayers, Medical Director   Clinic Name / Location: [address] [address] Clinic Phone Number: Dept: [phone]   Appointment Time: 3:00 Pm Visit Start Time: 4:01 PM   Check-In Time:  3:03 Pm Visit Discharge Time: 5:20 PM   Original-Treating Physician or Chiropractor    Page 2-Insurer/TPA    Page 3-Employer    Page 4-Employee

## 2019-11-21 NOTE — LETTER
"EMPLOYEE’S CLAIM FOR COMPENSATION/ REPORT OF INITIAL TREATMENT  FORM C-4    EMPLOYEE’S CLAIM - PROVIDE ALL INFORMATION REQUESTED   First Name  Virginia Last Name  Thomas Birthdate                    1980                Sex  female Claim Number   Home Address  [address]  Age  39 y.o. 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 (if applicable)  Cleaning     How did this injury or occupational disease occur? (Be specific an answer in detail. Use additional sheet if necessary)  Dust was blown into my eye    If you believe that you have an occupational disease, when did you first have knowledge of the disability and it relationship to your employment?  N/A Witnesses to the Accident  N/A      Nature of Injury or Occupational Disease  Vision Loss  Part(s) of Body Injured or Affected  Eye (R), N/A, N/A    I certify that the above is true and correct to the best of my knowledge and that I have provided this information in order to obtain the benefits of [place] inclusive or Chapter " 617 of [place]RS).  I hereby authorize any physician, chiropractor, surgeon, practitioner, or other person, any hospital, including [facility] or [facility], any medical service organization, any insurance company, or other institution or organization to release to each other, any medical or other information, including benefits paid or payable, pertinent to this injury or disease, except information relative to diagnosis, treatment and/or counseling for AIDS, psychological conditions, alcohol or controlled substances, for which I must give specific authorization.  A Photostat of this authorization shall be as valid as the original.     Date  11/21/2019   [facility]   Employee’s Signature   THIS REPORT MUST BE COMPLETED AND MAILED WITHIN 3 WORKING DAYS OF TREATMENT   Place  [facility]  Name of Facility  [place]   Date  11/21/2019 Diagnosis  (S05.91XA) Right eye injury, initial encounter  (H57.9) Sensation of foreign body in eye Is there evidence the injured employee was under the influence of alcohol and/or another controlled substance at the time of accident?   Hour  4:01 PM Description of Injury or Disease  Diagnoses of Right eye injury, initial encounter and Sensation of foreign body in eye were pertinent to this visit. No   Treatment  Eye irrigation.  Woods lamp exam.  Erythromycin eye ointment. OTC ibuprofen for pain.  Follow up in 24 hours.         Have you advised the patient to remain off work five days or more? No   X-Ray Findings      If Yes   From Date  To Date      From information given by the employee, together with medical evidence, can you directly connect this injury or occupational disease as job incurred?  Yes If No Full Duty Yes Modified Duty      Is additional medical care by a physician indicated?  Yes If Modified Duty, Specify any Limitations / Restrictions      Do you know of any previous injury or disease contributing to this condition or  "occupational disease?                            No   Date  11/21/2019 Print Doctor’s Name DEANNE Munoz I certify the employer’s copy of  this form was mailed on:   Address  [address] Insurer’s Use Only     [facility] Zip  [address]    Provider’s Tax ID Number  [id number] Telephone  Dept: [phone]        [email or web address] Vanegas   e-Signature: Dr. Craig Ayers,   Medical Director Degree  MD BUTLER-TREATING PHYSICIAN OR CHIROPRACTOR    PAGE 2-INSURER/TPA    PAGE 3-EMPLOYER    PAGE 4-EMPLOYEE             Form C-4 (rev.10/07)              BRIEF DESCRIPTION OF RIGHTS AND BENEFITS  (Pursuant to [place]RS 616C.050)    Notice of Injury or Occupational Disease (Incident Report Form C-1): If an injury or occupational disease (OD) arises out of and in the course of employment, you must provide written notice to your employer as soon as practicable, but no later than 7 days after the accident or OD. Your employer shall maintain a sufficient supply of the required forms.    Claim for Compensation (Form C-4): If medical treatment is sought, the form C-4 is available at the place of initial treatment. A completed \"Claim for Compensation\" (Form C-4) must be filed within 90 days after an accident or OD. The treating physician or chiropractor must, within 3 working days after treatment, complete and mail to the employer, the employer's insurer and third-party , the Claim for Compensation.    Medical Treatment: If you require medical treatment for your on-the-job injury or OD, you may be required to select a physician or chiropractor from a list provided by your workers’ compensation insurer, if it has contracted with an Organization for Managed Care (MCO) or Preferred Provider Organization (PPO) or providers of health care. If your employer has not entered into a contract with an MCO or PPO, you may select a physician or chiropractor from the Panel of Physicians and " Chiropractors. Any medical costs related to your industrial injury or OD will be paid by your insurer.    Temporary Total Disability (TTD): If your doctor has certified that you are unable to work for a period of at least 5 consecutive days, or 5 cumulative days in a 20-day period, or places restrictions on you that your employer does not accommodate, you may be entitled to TTD compensation.    Temporary Partial Disability (TPD): If the wage you receive upon reemployment is less than the compensation for TTD to which you are entitled, the insurer may be required to pay you TPD compensation to make up the difference. TPD can only be paid for a maximum of 24 months.    Permanent Partial Disability (PPD): When your medical condition is stable and there is an indication of a PPD as a result of your injury or OD, within 30 days, your insurer must arrange for an evaluation by a rating physician or chiropractor to determine the degree of your PPD. The amount of your PPD award depends on the date of injury, the results of the PPD evaluation and your age and wage.    Permanent Total Disability (PTD): If you are medically certified by a treating physician or chiropractor as permanently and totally disabled and have been granted a PTD status by your insurer, you are entitled to receive monthly benefits not to exceed 66 2/3% of your average monthly wage. The amount of your PTD payments is subject to reduction if you previously received a PPD award.    Vocational Rehabilitation Services: You may be eligible for vocational rehabilitation services if you are unable to return to the job due to a permanent physical impairment or permanent restrictions as a result of your injury or occupational disease.    Transportation and Per Pili Reimbursement: You may be eligible for travel expenses and per pili associated with medical treatment.    Reopening: You may be able to reopen your claim if your condition worsens after claim  closure.    Appeal Process: If you disagree with a written determination issued by the insurer or the insurer does not respond to your request, you may appeal to the Department of Administration, , by following the instructions contained in your determination letter. You must appeal the determination within 70 days from the date of the determination letter at [address], [address], [place], [place] [address], or [address], [address], [place], [place] [address]. If you disagree with the  decision, you may appeal to the Department of Administration, . You must file your appeal within 30 days from the date of the  decision letter at [address], [address], [place], [place] [address], or [address], [facility] [address], [place], [place] [address]. If you disagree with a decision of an , you may file a petition for judicial review with the District Court. You must do so within 30 days of the Appeal Officer’s decision. You may be represented by an  at your own expense or you may contact the [facility] for possible representation.    [place]  for Injured Workers (NAIW): If you disagree with a  decision, you may request that NAIW represent you without charge at an  Hearing. For information regarding denial of benefits, you may contact the [facility] at: [address], [address], [place] [address], [phone], or [address], [address], [place] [address], [phone]    To File a Complaint with the Division: If you wish to file a complaint with the  of the Division of Industrial Relations (DIR),  please contact the Workers’ Compensation Section, [address], [place], [place] [address], telephone [phone], or [address], [place], [place] [address], telephone [phone].    For assistance with Workers’  Compensation Issues: You may contact the Office of the Governor Consumer Health Assistance, [facility], [address], [place] [address], Toll Free 1-619.424.9638, Web site: http://govcha.Critical access hospital.nv., E-mail [email or web address].                   __________________________________________________________________                                                     __11/21/2019_______        Employee Name / Signature                                                                                                                                              Date                                                                                                                                                                                                     D-2 (rev. 06/18)

## 2019-11-22 ENCOUNTER — OCCUPATIONAL MEDICINE (OUTPATIENT)
Dept: URGENT CARE | Facility: PHYSICIAN GROUP | Age: 39
End: 2019-11-22
Payer: COMMERCIAL

## 2019-11-22 VITALS
TEMPERATURE: 98.1 F | HEART RATE: 78 BPM | WEIGHT: 195 LBS | OXYGEN SATURATION: 99 % | HEIGHT: 67 IN | DIASTOLIC BLOOD PRESSURE: 80 MMHG | SYSTOLIC BLOOD PRESSURE: 120 MMHG | BODY MASS INDEX: 30.61 KG/M2 | RESPIRATION RATE: 16 BRPM

## 2019-11-22 DIAGNOSIS — S05.01XD ABRASION OF RIGHT CORNEA, SUBSEQUENT ENCOUNTER: ICD-10-CM

## 2019-11-22 PROCEDURE — 99213 OFFICE O/P EST LOW 20 MIN: CPT | Mod: 29 | Performed by: NURSE PRACTITIONER

## 2019-11-22 ASSESSMENT — ENCOUNTER SYMPTOMS
FOREIGN BODY SENSATION: 0
EYE DISCHARGE: 0
VOMITING: 0
BLURRED VISION: 0
NAUSEA: 0
EYE REDNESS: 0
EYE PAIN: 1
PHOTOPHOBIA: 0

## 2019-11-22 NOTE — LETTER
[address] [place] [address]  Phone:  [phone] - Fax:  [phone]   [facility] Progress Report and Disability Certification  Date of Service: 11/22/2019   No Show:  No  Date / Time of Next Visit:     Claim Information   Patient Name: Virginia Guzman  Claim Number:     Employer: [employer]  Date of Injury: 11/21/2019     Insurer / TPA: [employer] [facility] [facility]  ID / SSN:     Occupation: PT  Diagnosis: The encounter diagnosis was Abrasion of right cornea, subsequent encounter.    Medical Information   Related to Industrial Injury? Yes    Subjective Complaints:  DOI: 11/21/19  Patient was seen in  initially after injury on 11/21.  Was working with a blower and blew dust and other particles into her eye.  No obvious corneal abrasion noted on initial exam, patient has been on erythromycin ointment since.  She presents today at 24 hours for follow-up as requested.  Patient states she has improved significantly and states she is pain-free at this time.  No visual changes.  She has been using the ophthalmic ointment as directed.   Objective Findings: No injection, redness, tearing, or drainage is noted in the right eye at this time.  No sub-conjunctival hemorrhage noted.   Pre-Existing Condition(s):     Assessment:   Condition Improved    Status: Discharged /  MMI  Permanent Disability:No    Plan:      Diagnostics:      Comments:       Disability Information   Status: Released to Full Duty    From:     Through:   Restrictions are:     Physical Restrictions   Sitting:    Standing:    Stooping:    Bending:      Squatting:    Walking:    Climbing:    Pushing:      Pulling:    Other:    Reaching Above Shoulder (L):   Reaching Above Shoulder (R):       Reaching Below Shoulder (L):    Reaching Below Shoulder (R):      Not to exceed Weight Limits   Carrying(hrs):   Weight Limit(lb):   Lifting(hrs):   Weight  Limit(lb):     Comments: Continue ophthalmic  ointment as directed.  Patient is discharged for MMI.  Return for recurrent symptoms.    Repetitive Actions   Hands: i.e. Fine Manipulations from Grasping:     Feet: i.e. Operating Foot Controls:     Driving / Operate Machinery:     Physician Name: Cathey J Hamman, A.P.N. Physician Signature: e-SignHAMMAN, CATHEY J A.P.N. e-Signature: Dr. Craig Ayers, Medical Director   Clinic Name / Location: [address] [address] Clinic Phone Number: Dept: [phone]   Appointment Time: 6:20 Pm Visit Start Time: 6:26 PM   Check-In Time:  6:16 Pm Visit Discharge Time:  6:53 PM   Original-Treating Physician or Chiropractor    Page 2-Insurer/TPA    Page 3-Employer    Page 4-Employee

## 2019-11-22 NOTE — PATIENT INSTRUCTIONS
Corneal Abrasion  The cornea is the clear covering at the front and center of the eye. When looking at the colored portion of the eye (iris), you are looking through the cornea. This very thin tissue is made up of many layers. The surface layer is a single layer of cells (corneal epithelium) and is one of the most sensitive tissues in the body. If a scratch or injury causes the corneal epithelium to come off, it is called a corneal abrasion. If the injury extends to the tissues below the epithelium, the condition is called a corneal ulcer.  CAUSES   · Scratches.  · Trauma.  · Foreign body in the eye.  Some people have recurrences of abrasions in the area of the original injury even after it has healed (recurrent erosion syndrome). Recurrent erosion syndrome generally improves and goes away with time.  SYMPTOMS   · Eye pain.  · Difficulty or inability to keep the injured eye open.  · The eye becomes very sensitive to light.  · Recurrent erosions tend to happen suddenly, first thing in the morning, usually after waking up and opening the eye.  DIAGNOSIS   Your health care provider can diagnose a corneal abrasion during an eye exam. Dye is usually placed in the eye using a drop or a small paper strip moistened by your tears. When the eye is examined with a special light, the abrasion shows up clearly because of the dye.  TREATMENT   · Small abrasions may be treated with antibiotic drops or ointment alone.  · A pressure patch may be put over the eye. If this is done, follow your doctor's instructions for when to remove the patch. Do not drive or use machines while the eye patch is on. Judging distances is hard to do with a patch on.  If the abrasion becomes infected and spreads to the deeper tissues of the cornea, a corneal ulcer can result. This is serious because it can cause corneal scarring. Corneal scars interfere with light passing through the cornea and cause a loss of vision in the involved eye.  HOME CARE  "INSTRUCTIONS  · Use medicine or ointment as directed. Only take over-the-counter or prescription medicines for pain, discomfort, or fever as directed by your health care provider.  · Do not drive or operate machinery if your eye is patched. Your ability to  distances is impaired.  · If your health care provider has given you a follow-up appointment, it is very important to keep that appointment. Not keeping the appointment could result in a severe eye infection or permanent loss of vision. If there is any problem keeping the appointment, let your health care provider know.  SEEK MEDICAL CARE IF:   · You have pain, light sensitivity, and a scratchy feeling in one eye or both eyes.  · Your pressure patch keeps loosening up, and you can blink your eye under the patch after treatment.  · Any kind of discharge develops from the eye after treatment or if the lids stick together in the morning.  · You have the same symptoms in the morning as you did with the original abrasion days, weeks, or months after the abrasion healed.  This information is not intended to replace advice given to you by your health care provider. Make sure you discuss any questions you have with your health care provider.  Document Released: 12/15/2001 Document Revised: 09/07/2016 Document Reviewed: 08/25/2014  [employer] Interactive Patient Education © 2017 [employer]  Corneal Foreign Body  A corneal foreign body is an injury from material in your eye. This foreign body became stuck in (lodged) in the clear layer that covers the front part of the eye. Specks of metal, sand or wood commonly cause this injury. Using a local anesthetic, your caregiver removed the foreign body in your cornea. This local anesthetic is a medication that makes the cornea numb. Your eye will be painful when the local anesthetic wears off. Blinking the eye increases pain, so sometimes a patch is applied to eliminate this. The more you rest your \"good eye\", the better " both eyes will feel.  HOME CARE INSTRUCTIONS   · The use of eye patches is not universal and their use varies from state to state and from caregiver to caregiver. If eye patch was applied:  · Keep your eye patch on for as long as directed by your caregiver until your follow-up appointment.  · Do NOT remove the patch unless instructed to do so to put in medications; then replace patch and re-tape it as it was before. Follow the same procedure if the patch becomes loose.  · WARNING: Do not drive or operate machinery while your eye is patched. Your ability to  distances is impaired.  · If no eye patch was applied:  · Keep your eye closed as much as possible. Do not rub your eye.  · Wear dark glasses for as long as directed by your caregiver to protect your eyes from bright light.  · Do not wear contact lenses for as long as directed by your caregiver.  · Wear protective eye covering if your job or hobby involves the risk of eye injury. This is especially important when working with high speed tools.  · Only take over-the-counter or prescription medicines for pain, discomfort, or fever as directed by your caregiver.  SEEK IMMEDIATE MEDICAL CARE IF:   · Pain increases in your eye or your vision changes.  · You have problems with your eye patch.  · The injury to your eye appears to be getting larger.  · You develop any kind of discharge from the injured eye.  · Swelling and/or soreness (inflammation) develops around the affected eye.  · An oral temperature above 102° F (38.9° C) develops.  MAKE SURE YOU:   · Understand these instructions.  · Will watch your condition.  · Will get help right away if you are not doing well or get worse.  Document Released: 12/15/2001 Document Revised: 03/11/2013 Document Reviewed: 08/05/2009  ExitCare® Patient Information ©2014 [employer].

## 2019-11-22 NOTE — PROGRESS NOTES
"Subjective:      Virginia Guzman is a 39 y.o. female who presents with Eye Injury (dust, dirt, plastic shavings were blown in her eye)            DOI: 11/21/2019. Was cleaning shavings from a  when she got down form the platform someone was using a blower to clean the area. A cloud of wood dust, poly, dirt went in to her  face. She felt something was in her right eye. She rinsed her eyes and face, eye wash station helped some. Feels like top of her eyelid is scratched. \"Eyeball feels fine\".  Stings with blinking. No pain, more of an eye discomfort. No visual changes. No contact use, wears glasses for reading. No second job.     Eye Injury    Pertinent negatives include no blurred vision or double vision.       Review of Systems   Eyes: Positive for pain. Negative for blurred vision and double vision.   Neurological: Negative for dizziness and headaches.   All other systems reviewed and are negative.         Objective:     /72   Pulse 76   Temp 36.7 °C (98.1 °F) (Temporal)   Resp 16   Ht 1.702 m (5' 7\")   Wt 88.8 kg (195 lb 12.8 oz)   SpO2 98%   BMI 30.67 kg/m²      Physical Exam  Vitals signs reviewed.   Constitutional:       General: She is not in acute distress.     Appearance: She is well-developed.   HENT:      Head: Normocephalic and atraumatic.      Right Ear: External ear normal.      Left Ear: External ear normal.      Nose: Nose normal.   Eyes:      General: Lids are normal. Lids are everted, no foreign bodies appreciated. Vision grossly intact.      Extraocular Movements: Extraocular movements intact.      Right eye: Normal extraocular motion and no nystagmus.      Conjunctiva/sclera: Conjunctivae normal.      Right eye: Right conjunctiva is not injected. No chemosis, exudate or hemorrhage.     Pupils: Pupils are equal, round, and reactive to light.      Right eye: No corneal abrasion or fluorescein uptake.      Comments: Flushed with normal saline. No fluorescein uptake noted with " woods lamp exam.    Neck:      Musculoskeletal: Normal range of motion.   Cardiovascular:      Rate and Rhythm: Normal rate.   Pulmonary:      Effort: Pulmonary effort is normal.   Musculoskeletal: Normal range of motion.   Skin:     General: Skin is warm and dry.      Findings: No rash.   Neurological:      Mental Status: She is alert and oriented to person, place, and time.      GCS: GCS eye subscore is 4. GCS verbal subscore is 5. GCS motor subscore is 6.   Psychiatric:         Speech: Speech normal.         Behavior: Behavior normal.         Thought Content: Thought content normal.         Judgment: Judgment normal.                 Assessment/Plan:     1. Right eye injury, initial encounter  - erythromycin 5 MG/GM Ointment; Place 1 Application in right eye 4 times a day for 7 days.  Dispense: 1 Tube; Refill: 0    2. Sensation of foreign body in eye  - erythromycin 5 MG/GM Ointment; Place 1 Application in right eye 4 times a day for 7 days.  Dispense: 1 Tube; Refill: 0  -Eye irrigation, eye wash station 5 minutes.   -Follow up in 24 hours.  -OTC ibuprofen    Discussed concerns for corneal abrasion. Follow up in 24 hours for reevaluation of symptoms. Emergently for eye pain or vision changes.

## 2019-12-19 ENCOUNTER — OFFICE VISIT (OUTPATIENT)
Dept: URGENT CARE | Facility: PHYSICIAN GROUP | Age: 39
End: 2019-12-19
Payer: COMMERCIAL

## 2019-12-19 VITALS
OXYGEN SATURATION: 98 % | TEMPERATURE: 98.2 F | DIASTOLIC BLOOD PRESSURE: 68 MMHG | BODY MASS INDEX: 30.54 KG/M2 | WEIGHT: 195 LBS | SYSTOLIC BLOOD PRESSURE: 118 MMHG | RESPIRATION RATE: 16 BRPM | HEART RATE: 75 BPM

## 2019-12-19 DIAGNOSIS — H65.91 FLUID LEVEL BEHIND TYMPANIC MEMBRANE OF RIGHT EAR: ICD-10-CM

## 2019-12-19 DIAGNOSIS — H60.501 ACUTE OTITIS EXTERNA OF RIGHT EAR, UNSPECIFIED TYPE: ICD-10-CM

## 2019-12-19 PROCEDURE — 99213 OFFICE O/P EST LOW 20 MIN: CPT | Performed by: PHYSICIAN ASSISTANT

## 2019-12-19 RX ORDER — NEOMYCIN SULFATE, POLYMYXIN B SULFATE AND HYDROCORTISONE 10; 3.5; 1 MG/ML; MG/ML; [USP'U]/ML
5 SUSPENSION/ DROPS AURICULAR (OTIC) 2 TIMES DAILY
Qty: 1 BOTTLE | Refills: 0 | Status: SHIPPED | OUTPATIENT
Start: 2019-12-19 | End: 2019-12-26

## 2019-12-19 RX ORDER — AMOXICILLIN 875 MG/1
875 TABLET, COATED ORAL 2 TIMES DAILY
Qty: 20 TAB | Refills: 0 | Status: SHIPPED | OUTPATIENT
Start: 2019-12-19 | End: 2020-10-30

## 2019-12-19 ASSESSMENT — ENCOUNTER SYMPTOMS
SENSORY CHANGE: 0
RHINORRHEA: 0
ABDOMINAL PAIN: 0
FEVER: 0
SINUS PAIN: 0
WHEEZING: 0
VOMITING: 0
MYALGIAS: 0
NECK PAIN: 0
TINGLING: 0
COUGH: 0
CHILLS: 0
FOCAL WEAKNESS: 0
NAUSEA: 0
SHORTNESS OF BREATH: 0
HEADACHES: 0
SORE THROAT: 0

## 2019-12-19 NOTE — LETTER
December 19, 2019         Patient: Virginia Guzman   YOB: 1980   Date of Visit: 12/19/2019           To Whom it May Concern:    Virginia Guzman was seen in my clinic on 12/19/2019. She is excused from work today.    If you have any questions or concerns, please don't hesitate to call.        Sincerely,           Shirley Luciano P.A.-C.  Electronically Signed

## 2019-12-19 NOTE — PROGRESS NOTES
Subjective:      Virginia Guzman is a 39 y.o. female who presents with Otalgia (when put earplugs in for work caused severe pain )            Otalgia    There is pain in the right ear. This is a new problem. Episode onset: 2-3 days  The problem occurs constantly. The problem has been unchanged. There has been no fever. Associated symptoms include hearing loss. Pertinent negatives include no abdominal pain, coughing, ear discharge, headaches, neck pain, rhinorrhea, sore throat or vomiting. She has tried nothing for the symptoms. Her past medical history is significant for hearing loss (mild hearing loss). recurrent ear infection.       History reviewed. No pertinent past medical history.    Past Surgical History:   Procedure Laterality Date   • PRIMARY C SECTION         History reviewed. No pertinent family history.    No Known Allergies    Medications, Allergies, and current problem list reviewed today in Epic    Review of Systems   Constitutional: Negative for chills, fever and malaise/fatigue.   HENT: Positive for ear pain and hearing loss. Negative for congestion, ear discharge, rhinorrhea, sinus pain and sore throat.    Respiratory: Negative for cough, shortness of breath and wheezing.    Gastrointestinal: Negative for abdominal pain, nausea and vomiting.   Musculoskeletal: Negative for myalgias and neck pain.   Neurological: Negative for tingling, sensory change, focal weakness and headaches.     All other systems reviewed and are negative.        Objective:     /68   Pulse 75   Temp 36.8 °C (98.2 °F)   Resp 16   Wt 88.5 kg (195 lb)   SpO2 98%   BMI 30.54 kg/m²      Physical Exam  Constitutional:       General: She is not in acute distress.  HENT:      Head: Normocephalic and atraumatic.      Right Ear: External ear normal. Swelling and tenderness (pain with tragus movement) present. A middle ear effusion is present.      Left Ear: Tympanic membrane, ear canal and external ear normal.   Eyes:       Conjunctiva/sclera: Conjunctivae normal.   Cardiovascular:      Rate and Rhythm: Normal rate.   Pulmonary:      Effort: Pulmonary effort is normal. No respiratory distress.   Musculoskeletal: Normal range of motion.   Skin:     General: Skin is warm and dry.      Findings: No rash.   Neurological:      General: No focal deficit present.      Mental Status: She is alert and oriented to person, place, and time.   Psychiatric:         Mood and Affect: Mood normal.         Behavior: Behavior normal.         Thought Content: Thought content normal.         Judgment: Judgment normal.                 Assessment/Plan:       1. Acute otitis externa of right ear, unspecified type    - neomycin-polymyxin-HC (PEDIOTIC HC) 3.5-24904-6 Suspension; Place 5 Drops in ear 2 Times a Day for 7 days.  Dispense: 1 Bottle; Refill: 0    2. Fluid level behind tympanic membrane of right ear    - amoxicillin (AMOXIL) 875 MG tablet; Take 1 Tab by mouth 2 times a day.  Dispense: 20 Tab; Refill: 0     Differential diagnoses, Supportive care, and indications for immediate follow-up discussed with patient.   Instructed to return to clinic or nearest emergency department for any change in condition, further concerns, or worsening of symptoms.    The patient demonstrated a good understanding and agreed with the treatment plan.    Shirley Luciano P.A.-C.

## 2019-12-23 ENCOUNTER — OFFICE VISIT (OUTPATIENT)
Dept: URGENT CARE | Facility: PHYSICIAN GROUP | Age: 39
End: 2019-12-23
Payer: COMMERCIAL

## 2019-12-23 ENCOUNTER — TELEPHONE (OUTPATIENT)
Dept: URGENT CARE | Facility: PHYSICIAN GROUP | Age: 39
End: 2019-12-23

## 2019-12-23 VITALS
WEIGHT: 195 LBS | RESPIRATION RATE: 16 BRPM | HEART RATE: 78 BPM | SYSTOLIC BLOOD PRESSURE: 124 MMHG | BODY MASS INDEX: 30.54 KG/M2 | DIASTOLIC BLOOD PRESSURE: 72 MMHG | OXYGEN SATURATION: 97 % | TEMPERATURE: 98.4 F

## 2019-12-23 DIAGNOSIS — H92.01 ACUTE OTALGIA, RIGHT: ICD-10-CM

## 2019-12-23 PROCEDURE — 99213 OFFICE O/P EST LOW 20 MIN: CPT | Performed by: NURSE PRACTITIONER

## 2019-12-23 RX ORDER — MECLIZINE HYDROCHLORIDE 25 MG/1
25 TABLET ORAL 3 TIMES DAILY PRN
Qty: 30 TAB | Refills: 0 | Status: SHIPPED | OUTPATIENT
Start: 2019-12-23 | End: 2020-10-30

## 2019-12-23 RX ORDER — METHYLPREDNISOLONE 4 MG/1
TABLET ORAL
Qty: 21 TAB | Refills: 0 | Status: SHIPPED | OUTPATIENT
Start: 2019-12-23 | End: 2020-10-30

## 2019-12-23 ASSESSMENT — ENCOUNTER SYMPTOMS
NAUSEA: 1
CHILLS: 0
DIZZINESS: 1
FEVER: 0

## 2019-12-23 NOTE — PROGRESS NOTES
Subjective:      Virginia Guzman is a 39 y.o. female who presents with Otalgia (per pt ABx is not working/ pt was here last week for the same reason); Dizziness; and Nausea    History reviewed. No pertinent past medical history.  Social History     Socioeconomic History   • Marital status: Single     Spouse name: Not on file   • Number of children: Not on file   • Years of education: Not on file   • Highest education level: Not on file   Occupational History   • Not on file   Social Needs   • Financial resource strain: Not on file   • Food insecurity:     Worry: Not on file     Inability: Not on file   • Transportation needs:     Medical: Not on file     Non-medical: Not on file   Tobacco Use   • Smoking status: Current Every Day Smoker     Packs/day: 0.00   • Smokeless tobacco: Never Used   Substance and Sexual Activity   • Alcohol use: Not on file   • Drug use: Not on file   • Sexual activity: Not on file   Lifestyle   • Physical activity:     Days per week: Not on file     Minutes per session: Not on file   • Stress: Not on file   Relationships   • Social connections:     Talks on phone: Not on file     Gets together: Not on file     Attends Holiness service: Not on file     Active member of club or organization: Not on file     Attends meetings of clubs or organizations: Not on file     Relationship status: Not on file   • Intimate partner violence:     Fear of current or ex partner: Not on file     Emotionally abused: Not on file     Physically abused: Not on file     Forced sexual activity: Not on file   Other Topics Concern   • Not on file   Social History Narrative   • Not on file     History reviewed. No pertinent family history.    Allergies :Patient has no known allergies.    Patient is a 39-year-old female who presents today with complaint of continued right ear pain and now dizziness with nausea.  She was seen in office 4 days ago for the same complaint and was started on amoxicillin with eardrops for  middle ear effusion.            Otalgia    There is pain in the right ear. This is a new problem. The current episode started in the past 7 days. The problem occurs constantly. The problem has been unchanged. There has been no fever. Associated symptoms comments: Nausea  dizziness. She has tried nothing for the symptoms. The treatment provided no relief.   Dizziness   Associated symptoms include nausea. Pertinent negatives include no chills or fever.   Nausea   Associated symptoms include nausea. Pertinent negatives include no chills or fever.       Review of Systems   Constitutional: Positive for malaise/fatigue. Negative for chills and fever.   HENT: Positive for ear pain.    Gastrointestinal: Positive for nausea.   Skin: Negative.    Neurological: Positive for dizziness.   All other systems reviewed and are negative.         Objective:     /72   Pulse 78   Temp 36.9 °C (98.4 °F) (Temporal)   Resp 16   Wt 88.5 kg (195 lb)   SpO2 97%   BMI 30.54 kg/m²      Physical Exam  Vitals signs reviewed.   Constitutional:       Appearance: Normal appearance. She is normal weight.   HENT:      Head: Normocephalic.      Comments: Immediate postauricular tenderness, no tenderness over the mastoid.  Preauricular tenderness and tenderness over the tragus is noted.  Ear canal appears to be clear.  Tympanic membrane is translucent and bulging slightly, there does appear to be fluid still retained in the middle ear.     Right Ear: Tympanic membrane, ear canal and external ear normal.      Left Ear: Tympanic membrane, ear canal and external ear normal.      Nose: Nose normal.      Mouth/Throat:      Mouth: Mucous membranes are moist.   Eyes:      Extraocular Movements: Extraocular movements intact.      Conjunctiva/sclera: Conjunctivae normal.      Pupils: Pupils are equal, round, and reactive to light.   Neck:      Musculoskeletal: Normal range of motion and neck supple.   Cardiovascular:      Rate and Rhythm: Normal  rate.      Heart sounds: Normal heart sounds.   Pulmonary:      Effort: Pulmonary effort is normal.      Breath sounds: Normal breath sounds.   Musculoskeletal: Normal range of motion.   Skin:     General: Skin is warm and dry.      Capillary Refill: Capillary refill takes less than 2 seconds.   Neurological:      General: No focal deficit present.      Mental Status: She is alert and oriented to person, place, and time.      GCS: GCS eye subscore is 4. GCS verbal subscore is 5. GCS motor subscore is 6.      Sensory: Sensation is intact.      Motor: Motor function is intact.      Coordination: Coordination is intact.      Gait: Gait is intact. Gait and tandem walk normal.   Psychiatric:         Mood and Affect: Mood normal.         Behavior: Behavior normal.         Thought Content: Thought content normal.         Judgment: Judgment normal.                 Assessment/Plan:       1. Acute otalgia, right  2. Vertigo  3. Nausea    zofran PRN nausea; patient declined  antivert  Medrol dose michell  Continue other present medications  Referral given to ENT  ER precautions for worsening of symptoms.

## 2019-12-23 NOTE — TELEPHONE ENCOUNTER
Patient was seen by you 12/19/19 in [facility], patient states they were told if they needed more time off work due to ear pain that they could call and get extended time? Please let me know if patient needs to be re-seen or if I can write her a note for work. Thank you.

## 2019-12-23 NOTE — LETTER
December 23, 2019       Patient: Virginia Guzman   YOB: 1980   Date of Visit: 12/23/2019         To Whom It May Concern:    It is my medical opinion that Virginia Guzman may return to work on 12/27/19.    If you have any questions or concerns, please don't hesitate to call [phone]          Sincerely,          Cathey J Hamman, A.P.N.  Electronically Signed

## 2020-03-17 ENCOUNTER — HOSPITAL ENCOUNTER (OUTPATIENT)
Facility: MEDICAL CENTER | Age: 40
End: 2020-03-17
Attending: NURSE PRACTITIONER
Payer: COMMERCIAL

## 2020-03-17 ENCOUNTER — OFFICE VISIT (OUTPATIENT)
Dept: URGENT CARE | Facility: PHYSICIAN GROUP | Age: 40
End: 2020-03-17
Payer: COMMERCIAL

## 2020-03-17 VITALS — HEART RATE: 79 BPM | TEMPERATURE: 97.2 F | OXYGEN SATURATION: 99 %

## 2020-03-17 DIAGNOSIS — J06.9 ACUTE URI: ICD-10-CM

## 2020-03-17 DIAGNOSIS — R50.9 FEVER, UNSPECIFIED FEVER CAUSE: ICD-10-CM

## 2020-03-17 DIAGNOSIS — R05.9 COUGH: ICD-10-CM

## 2020-03-17 LAB
C PNEUM DNA SPEC QL NAA+PROBE: NOT DETECTED
FLUAV H1 2009 PAND RNA SPEC QL NAA+PROBE: NOT DETECTED
FLUAV H1 RNA SPEC QL NAA+PROBE: NOT DETECTED
FLUAV H3 RNA SPEC QL NAA+PROBE: NOT DETECTED
FLUAV RNA SPEC QL NAA+PROBE: NEGATIVE
FLUAV RNA SPEC QL NAA+PROBE: NOT DETECTED
FLUAV+FLUBV AG SPEC QL IA: NEGATIVE
FLUBV RNA SPEC QL NAA+PROBE: NEGATIVE
FLUBV RNA SPEC QL NAA+PROBE: NOT DETECTED
HADV DNA SPEC QL NAA+PROBE: NOT DETECTED
HCOV RNA SPEC QL NAA+PROBE: NOT DETECTED
HMPV RNA SPEC QL NAA+PROBE: NOT DETECTED
HPIV1 RNA SPEC QL NAA+PROBE: NOT DETECTED
HPIV2 RNA SPEC QL NAA+PROBE: NOT DETECTED
HPIV3 RNA SPEC QL NAA+PROBE: NOT DETECTED
HPIV4 RNA SPEC QL NAA+PROBE: NOT DETECTED
INT CON NEG: NEGATIVE
INT CON NEG: NEGATIVE
INT CON POS: POSITIVE
INT CON POS: POSITIVE
M PNEUMO DNA SPEC QL NAA+PROBE: NOT DETECTED
RSV A RNA SPEC QL NAA+PROBE: NOT DETECTED
RSV B RNA SPEC QL NAA+PROBE: NOT DETECTED
RV+EV RNA SPEC QL NAA+PROBE: NOT DETECTED
S PYO AG THROAT QL: NEGATIVE

## 2020-03-17 PROCEDURE — 87581 M.PNEUMON DNA AMP PROBE: CPT

## 2020-03-17 PROCEDURE — 99214 OFFICE O/P EST MOD 30 MIN: CPT | Performed by: NURSE PRACTITIONER

## 2020-03-17 PROCEDURE — 87633 RESP VIRUS 12-25 TARGETS: CPT

## 2020-03-17 PROCEDURE — U0002 COVID-19 LAB TEST NON-CDC: HCPCS

## 2020-03-17 PROCEDURE — 87502 INFLUENZA DNA AMP PROBE: CPT

## 2020-03-17 PROCEDURE — 87880 STREP A ASSAY W/OPTIC: CPT | Performed by: NURSE PRACTITIONER

## 2020-03-17 PROCEDURE — 87804 INFLUENZA ASSAY W/OPTIC: CPT | Performed by: NURSE PRACTITIONER

## 2020-03-17 PROCEDURE — 87486 CHLMYD PNEUM DNA AMP PROBE: CPT

## 2020-03-17 ASSESSMENT — ENCOUNTER SYMPTOMS
FEVER: 1
SPUTUM PRODUCTION: 0
HEMOPTYSIS: 0
WHEEZING: 0
SORE THROAT: 1
CHILLS: 1
COUGH: 1
SHORTNESS OF BREATH: 1

## 2020-03-17 NOTE — PROGRESS NOTES
Subjective:      Virginia Guzman is a 40 y.o. female who presents with Fever (travel to [place]); Cough; and Pharyngitis    History reviewed. No pertinent past medical history.  Social History     Socioeconomic History   • Marital status: Single     Spouse name: Not on file   • Number of children: Not on file   • Years of education: Not on file   • Highest education level: Not on file   Occupational History   • Not on file   Social Needs   • Financial resource strain: Not on file   • Food insecurity     Worry: Not on file     Inability: Not on file   • Transportation needs     Medical: Not on file     Non-medical: Not on file   Tobacco Use   • Smoking status: Current Every Day Smoker     Packs/day: 0.00   • Smokeless tobacco: Never Used   Substance and Sexual Activity   • Alcohol use: Not on file   • Drug use: Not on file   • Sexual activity: Not on file   Lifestyle   • Physical activity     Days per week: Not on file     Minutes per session: Not on file   • Stress: Not on file   Relationships   • Social connections     Talks on phone: Not on file     Gets together: Not on file     Attends Scientology service: Not on file     Active member of club or organization: Not on file     Attends meetings of clubs or organizations: Not on file     Relationship status: Not on file   • Intimate partner violence     Fear of current or ex partner: Not on file     Emotionally abused: Not on file     Physically abused: Not on file     Forced sexual activity: Not on file   Other Topics Concern   • Not on file   Social History Narrative   • Not on file     History reviewed. No pertinent family history.    Allergies: Patient has no known allergies.    Patient is a 40-year-old female who presents today with complaint of fever, aches, chills, and cough.  Symptoms started over the last week.  She was briefly in [place] in [place] at the [employer].  Thus, she does trigger positive answers on the Covid screening  form          Fever    This is a new problem. The current episode started in the past 7 days. The problem occurs intermittently. The problem has been waxing and waning. Her temperature was unmeasured prior to arrival. Associated symptoms include congestion, coughing, muscle aches and a sore throat. Pertinent negatives include no wheezing. She has tried nothing for the symptoms. The treatment provided no relief.       Review of Systems   Constitutional: Positive for chills, fever and malaise/fatigue.   HENT: Positive for congestion and sore throat.    Respiratory: Positive for cough and shortness of breath. Negative for hemoptysis, sputum production and wheezing.           Objective:     There were no vitals taken for this visit.     Physical Exam  Vitals signs reviewed.   Constitutional:       Appearance: Normal appearance.   HENT:      Head: Normocephalic and atraumatic.      Right Ear: Tympanic membrane, ear canal and external ear normal.      Left Ear: Tympanic membrane, ear canal and external ear normal. There is no impacted cerumen.      Nose: Congestion and rhinorrhea present.      Mouth/Throat:      Mouth: Mucous membranes are moist.      Pharynx: Posterior oropharyngeal erythema present.   Eyes:      Extraocular Movements: Extraocular movements intact.      Conjunctiva/sclera: Conjunctivae normal.      Pupils: Pupils are equal, round, and reactive to light.   Neck:      Musculoskeletal: Normal range of motion and neck supple.   Cardiovascular:      Rate and Rhythm: Normal rate and regular rhythm.      Heart sounds: Normal heart sounds.   Pulmonary:      Effort: Pulmonary effort is normal. No respiratory distress.      Breath sounds: Normal breath sounds. No stridor. No wheezing, rhonchi or rales.   Chest:      Chest wall: No tenderness.   Musculoskeletal: Normal range of motion.   Skin:     General: Skin is warm and dry.      Capillary Refill: Capillary refill takes less than 2 seconds.   Neurological:       Mental Status: She is alert and oriented to person, place, and time.   Psychiatric:         Mood and Affect: Mood normal.         Behavior: Behavior normal.         Thought Content: Thought content normal.         Judgment: Judgment normal.       poct strep: negative     poct influenza: negative           Assessment/Plan:       1. Fever, unspecified fever cause    - POCT Influenza A/B  - POCT Rapid Strep A  - Influenza A/B By PCR (Adult - Flu Only); Future    2. Cough    - POCT Influenza A/B  - POCT Rapid Strep A  - Influenza A/B By PCR (Adult - Flu Only); Future    3. Acute URI  - Tylenol/motrin PRN  -push fluids  -otc cough medication of choice  Strict ER precautions for respiratory distress  Quarantine instructions given.  Patient was advised that if her respiratory panel is positive, I will notify her.  If not, I advised her the panel will reflex to the state and she will be notified by them.

## 2020-03-19 ENCOUNTER — TELEPHONE (OUTPATIENT)
Dept: URGENT CARE | Facility: PHYSICIAN GROUP | Age: 40
End: 2020-03-19

## 2020-03-19 NOTE — TELEPHONE ENCOUNTER
Pt is requesting a call back as soon as Provider can. Pt would like to know her results as soon as their available.   Thank you so much.

## 2020-03-21 ENCOUNTER — TELEPHONE (OUTPATIENT)
Dept: URGENT CARE | Facility: PHYSICIAN GROUP | Age: 40
End: 2020-03-21

## 2020-03-21 LAB
SARS-COV-2 RNA SPEC QL NAA+PROBE: NOT DETECTED
SPECIMEN SOURCE: NORMAL

## 2020-03-21 NOTE — TELEPHONE ENCOUNTER
Gave Pt courtesy call regarding respiratory panel. Pt is requesting from provider a new letter saying she is okay to go back to work.   Please advise.

## 2020-03-30 ENCOUNTER — TELEPHONE (OUTPATIENT)
Dept: URGENT CARE | Facility: PHYSICIAN GROUP | Age: 40
End: 2020-03-30

## 2020-04-01 ENCOUNTER — TELEPHONE (OUTPATIENT)
Dept: DERMATOLOGY | Facility: IMAGING CENTER | Age: 40
End: 2020-04-01

## 2020-07-23 ENCOUNTER — OFFICE VISIT (OUTPATIENT)
Dept: URGENT CARE | Facility: CLINIC | Age: 40
End: 2020-07-23

## 2020-07-23 VITALS
SYSTOLIC BLOOD PRESSURE: 122 MMHG | HEIGHT: 64 IN | OXYGEN SATURATION: 98 % | RESPIRATION RATE: 14 BRPM | HEART RATE: 86 BPM | BODY MASS INDEX: 33.87 KG/M2 | DIASTOLIC BLOOD PRESSURE: 70 MMHG | WEIGHT: 198.41 LBS | TEMPERATURE: 97.9 F

## 2020-07-23 DIAGNOSIS — Z02.1 PHYSICAL EXAM, PRE-EMPLOYMENT: ICD-10-CM

## 2020-07-23 DIAGNOSIS — Z02.1 PRE-EMPLOYMENT DRUG SCREENING: ICD-10-CM

## 2020-07-23 PROCEDURE — 99000 SPECIMEN HANDLING OFFICE-LAB: CPT | Performed by: INTERNAL MEDICINE

## 2020-07-23 PROCEDURE — 8915 PR COMPREHENSIVE PHYSICAL: Performed by: INTERNAL MEDICINE

## 2020-07-23 PROCEDURE — 92552 PURE TONE AUDIOMETRY AIR: CPT | Performed by: INTERNAL MEDICINE

## 2020-07-23 NOTE — PROGRESS NOTES
"Subjective:   Virginia Guzman is a 40 y.o. female who presents for Employment Physical (Px/Audio/HDS)        HPI  ROS  No Known Allergies   Objective:   /70   Pulse 86   Temp 36.6 °C (97.9 °F) (Temporal)   Resp 14   Ht 1.626 m (5' 4\")   Wt 90 kg (198 lb 6.6 oz)   SpO2 98%   BMI 34.06 kg/m²   Physical Exam      Assessment/Plan:   1. Physical exam, pre-employment    2. Pre-employment drug screening    See form      Differential diagnosis, natural history, supportive care, and indications for immediate follow-up discussed.     "

## 2020-10-30 ENCOUNTER — OFFICE VISIT (OUTPATIENT)
Dept: URGENT CARE | Facility: PHYSICIAN GROUP | Age: 40
End: 2020-10-30
Payer: COMMERCIAL

## 2020-10-30 ENCOUNTER — APPOINTMENT (OUTPATIENT)
Dept: RADIOLOGY | Facility: IMAGING CENTER | Age: 40
End: 2020-10-30
Attending: FAMILY MEDICINE
Payer: COMMERCIAL

## 2020-10-30 VITALS
RESPIRATION RATE: 16 BRPM | SYSTOLIC BLOOD PRESSURE: 132 MMHG | DIASTOLIC BLOOD PRESSURE: 80 MMHG | BODY MASS INDEX: 34.33 KG/M2 | WEIGHT: 200 LBS | HEART RATE: 74 BPM | OXYGEN SATURATION: 98 % | TEMPERATURE: 97.4 F

## 2020-10-30 DIAGNOSIS — S89.92XA INJURY OF LEFT KNEE, INITIAL ENCOUNTER: ICD-10-CM

## 2020-10-30 DIAGNOSIS — S83.92XA SPRAIN OF LEFT KNEE, UNSPECIFIED LIGAMENT, INITIAL ENCOUNTER: ICD-10-CM

## 2020-10-30 PROCEDURE — 73564 X-RAY EXAM KNEE 4 OR MORE: CPT | Mod: TC,FY,LT | Performed by: FAMILY MEDICINE

## 2020-10-30 PROCEDURE — 99214 OFFICE O/P EST MOD 30 MIN: CPT | Performed by: FAMILY MEDICINE

## 2020-10-30 NOTE — LETTER
October 30, 2020         Patient: Virginia Guzman   YOB: 1980   Date of Visit: 10/30/2020           To Whom it May Concern:    Virginia Guzman was seen in my clinic on 10/30/2020.     Please excuse from work for partial day on 10/30/2020 due to medical condition.    If you have any questions or concerns, please don't hesitate to call.        Sincerely,           Dylan Troncoso M.D.  Electronically Signed

## 2020-10-31 NOTE — PROGRESS NOTES
Chief Complaint:    Chief Complaint   Patient presents with   • Knee Pain     (L) knee        History of Present Illness:    This is a new problem. She has left knee pain. Today, she was doing some moving, she was about to receive an item from the truck, she stepped down, and the left knee buckled causing her to fall. She saw the knee was twisted and she stepped down and extended left knee, twisted left knee, and popped it back in place. She was able to go to work afterwards, but found left knee to be hurting a lot after going down some steps. She took Naproxen and Tylenol for this.      Review of Systems:    Constitutional: Negative for fever, chills, and diaphoresis.   Eyes: Negative for change in vision, photophobia, pain, redness, and discharge.  ENT: Negative for ear pain, ear discharge, hearing loss, tinnitus, nasal congestion, nosebleeds, and sore throat.    Respiratory: Negative for cough, hemoptysis, sputum production, shortness of breath, wheezing, and stridor.    Cardiovascular: Negative for chest pain, palpitations, orthopnea, claudication, leg swelling, and PND.   Gastrointestinal: Negative for abdominal pain, nausea, vomiting, diarrhea, constipation, blood in stool, and melena.   Genitourinary: Negative for dysuria, urinary urgency, urinary frequency, hematuria, and flank pain.   Musculoskeletal: See HPI.   Skin: Negative for rash and itching.   Neurological: Negative for dizziness, tingling, tremors, sensory change, speech change, focal weakness, seizures, loss of consciousness, and headaches.   Endo: Negative for polydipsia.   Heme: Does not bruise/bleed easily.   Psychiatric/Behavioral: Negative for depression, suicidal ideas, hallucinations, memory loss and substance abuse. The patient is not nervous/anxious and does not have insomnia.        Past Medical History:    History reviewed. No pertinent past medical history.    Past Surgical History:    Past Surgical History:   Procedure Laterality Date    • PRIMARY C SECTION       Social History:    Social History     Socioeconomic History   • Marital status: Single     Spouse name: Not on file   • Number of children: Not on file   • Years of education: Not on file   • Highest education level: Not on file   Occupational History   • Not on file   Social Needs   • Financial resource strain: Not on file   • Food insecurity     Worry: Not on file     Inability: Not on file   • Transportation needs     Medical: Not on file     Non-medical: Not on file   Tobacco Use   • Smoking status: Current Every Day Smoker     Packs/day: 0.00   • Smokeless tobacco: Never Used   Substance and Sexual Activity   • Alcohol use: Not Currently   • Drug use: Not Currently   • Sexual activity: Not on file   Lifestyle   • Physical activity     Days per week: Not on file     Minutes per session: Not on file   • Stress: Not on file   Relationships   • Social connections     Talks on phone: Not on file     Gets together: Not on file     Attends Yazdanism service: Not on file     Active member of club or organization: Not on file     Attends meetings of clubs or organizations: Not on file     Relationship status: Not on file   • Intimate partner violence     Fear of current or ex partner: Not on file     Emotionally abused: Not on file     Physically abused: Not on file     Forced sexual activity: Not on file   Other Topics Concern   • Not on file   Social History Narrative   • Not on file     Family History:    History reviewed. No pertinent family history.    Medications:    No current outpatient medications on file prior to visit.     No current facility-administered medications on file prior to visit.      Allergies:    No Known Allergies      Vitals:    Vitals:    10/30/20 1843   BP: 132/80   Pulse: 74   Resp: 16   Temp: 36.3 °C (97.4 °F)   SpO2: 98%   Weight: 90.7 kg (200 lb)       Physical Exam:    Constitutional: Vital signs reviewed. Appears well-developed and well-nourished. No acute  distress.   Eyes: Sclera white, conjunctivae clear.  ENT: External ears normal. Hearing normal.  Cardiovascular: Peripheral pulses 2+. No edema.   Pulmonary/Chest: Respirations non-labored.  Musculoskeletal: Antalgic gait due to left knee pain. Left knee has essentially normal range of motion without instability and no significant swelling.  Neurological: Alert and oriented to person, place, and time. Muscle tone normal. Coordination normal. Light touch and sensation normal.   Skin: No rashes or lesions. Warm, dry, normal turgor.  Psychiatric: Normal mood and affect. Behavior is normal. Judgment and thought content normal.       Diagnostics:    DX-KNEE COMPLETE 4+ LEFT  Order: [id number]  Status:  Final result   Visible to patient:  No (not released) Next appt:  None Dx:  Injury of left knee, initial encounter  Details    Reading Physician Reading Date Result Priority   Garland Knott M.D.  [phone] 10/30/2020 Urgent [facility]      Narrative & Impression        10/30/2020 6:44 PM     HISTORY/REASON FOR EXAM:  Pain/Deformity Following Trauma; popping of the knee     TECHNIQUE/EXAM DESCRIPTION AND NUMBER OF VIEWS:  4 views of the LEFT knee.     COMPARISON: None     FINDINGS:  No acute fracture or dislocation.     No joint osteoarthritis     No knee joint effusion.     IMPRESSION:        1. No acute osseous abnormality.           I personally reviewed the images. Rad report reviewed with her and copy of report to her.      Assessment / Plan:    1. Injury of left knee, initial encounter  - DX-KNEE COMPLETE 4+ LEFT; Future    2. Sprain of left knee, unspecified ligament, initial encounter       Work note given - excuse for partial day on 10/30/2020.    Discussed with her DDX, management options, and risks, benefits, and alternatives to treatment plan agreed upon.    Rec'd relative rest. Warned of potential increased pain, stiffness, and swelling tomorrow.    Hinged knee brace provided to use on left knee prn.    May  continue over-the-counter Naproxen (Aleve) as needed for pain and swelling for anti-inflammatory effect.    May continue over-the-counter Acetaminophen (Tylenol) as needed for pain.    Discussed expected course of duration, time for improvement, and to seek follow-up in Emergency Room, urgent care, or with PCP if getting worse at any time or not improving within expected time frame.

## 2020-12-16 ENCOUNTER — APPOINTMENT (OUTPATIENT)
Dept: RADIOLOGY | Facility: IMAGING CENTER | Age: 40
End: 2020-12-16
Attending: PHYSICIAN ASSISTANT
Payer: COMMERCIAL

## 2020-12-16 ENCOUNTER — OFFICE VISIT (OUTPATIENT)
Dept: URGENT CARE | Facility: PHYSICIAN GROUP | Age: 40
End: 2020-12-16
Payer: COMMERCIAL

## 2020-12-16 ENCOUNTER — NURSE TRIAGE (OUTPATIENT)
Dept: HEALTH INFORMATION MANAGEMENT | Facility: OTHER | Age: 40
End: 2020-12-16

## 2020-12-16 VITALS
HEIGHT: 65 IN | WEIGHT: 201.6 LBS | DIASTOLIC BLOOD PRESSURE: 76 MMHG | HEART RATE: 83 BPM | BODY MASS INDEX: 33.59 KG/M2 | TEMPERATURE: 98.1 F | RESPIRATION RATE: 16 BRPM | OXYGEN SATURATION: 97 % | SYSTOLIC BLOOD PRESSURE: 110 MMHG

## 2020-12-16 DIAGNOSIS — R07.9 CHEST PAIN, UNSPECIFIED TYPE: ICD-10-CM

## 2020-12-16 PROCEDURE — 99214 OFFICE O/P EST MOD 30 MIN: CPT | Performed by: PHYSICIAN ASSISTANT

## 2020-12-16 PROCEDURE — 71046 X-RAY EXAM CHEST 2 VIEWS: CPT | Mod: TC,FY | Performed by: PHYSICIAN ASSISTANT

## 2020-12-16 NOTE — PROGRESS NOTES
Chief Complaint   Patient presents with   • Chest Pain     started friday, pain last for 5-7 seconds. pain shoots to (L) side arm.        HISTORY OF PRESENT ILLNESS: Patient is a 40 y.o. female who presents today for the following:    Patient comes in for evaluation of chest pain that started Friday, 12/11/2020.  She had acute onset left-sided chest pain that felt like it was radiating to her left axilla, left shoulder, down the left arm, and into the left upper back.  Lasts about 45 seconds.  She reports associated diaphoresis and shortness of breath.  She was simply standing when it occurred.  She felt chest achiness after the episode ended and felt very fatigued.  She had a similar episode later that evening but only lasted 10 seconds.  She has had 3-4 similar episodes daily since then, lasting approximately 10 seconds,, not radiating anywhere except into the axilla.  She has been a pack-a-day smoker since around 1996 and has recently cut back to approximately 5 cigarettes a day.  She denies any history of alcohol abuse and has never used illicit drugs.  She has no personal or family history of MI, CVA, DVT/PE.  She denies history of the same and recent illness.    There are no active problems to display for this patient.      Allergies:Patient has no known allergies.    No current [facility]-ordered outpatient medications on file.     No current [facility]-ordered facility-administered medications on file.        History reviewed. No pertinent past medical history.    Social History     Tobacco Use   • Smoking status: Current Every Day Smoker     Packs/day: 0.00     Start date: 12/16/1996   • Smokeless tobacco: Never Used   Substance Use Topics   • Alcohol use: Not Currently   • Drug use: Never       No family status information on file.   History reviewed. No pertinent family history.    Review of Systems:    Constitutional ROS: No unexpected change in weight, No weakness, No fatigue  Eye ROS: No recent significant change  "in vision, No eye pain, redness, discharge  Ear ROS: No drainage, No tinnitus or vertigo, No recent change in hearing  Mouth/Throat ROS: No teeth or gum problems, No bleeding gums, No tongue complaints  Neck ROS: No swollen glands, No significant pain in neck  Pulmonary ROS: Positive for shortness of breath.  Cardiovascular ROS: Positive for chest pain.  Musculoskeletal/Extremities ROS: No peripheral edema, No pain, redness or swelling on the joints  Hematologic/Lymphatic ROS: No chills, No night sweats, No weight loss  Skin/Integumentary ROS: No edema, No evidence of rash, No itching      Exam:  /76   Pulse 83   Temp 36.7 °C (98.1 °F) (Temporal)   Resp 16   Ht 1.651 m (5' 5\")   Wt 91.4 kg (201 lb 9.6 oz)   SpO2 97%   General: Well developed, well nourished. No distress.    HEENT: Head is grossly normal.  Pulmonary: Unlabored respiratory effort. Lungs clear to auscultation, no wheezes, no rhonchi.    Cardiovascular: Regular rate and rhythm without murmur.  No chest wall tenderness noted.  No friction rub noted.  Neurologic: Grossly nonfocal. No facial asymmetry noted.  Lymph: No cervical lymphadenopathy noted.  Skin: Warm, dry, good turgor. No rashes in visible areas.   Psych: Normal mood. Alert and oriented to person, place and time.    EKG, per my interpretation: Normal sinus rhythm.  Normal axis.  No ST elevation/depression.    Chest x-ray, per radiology:  1.  Unremarkable two view chest.    Assessment/Plan:  Chest x-ray and EKG are unremarkable.  All episodes have occurred at rest and are fleeting.  Low suspicion for PE.  Urgent referral placed to cardiology.  Discussed red flags and strict ER precautions.  1. Chest pain, unspecified type  DX-CHEST-2 VIEWS    EKG    REFERRAL TO CARDIOLOGY       "

## 2020-12-16 NOTE — LETTER
December 16, 2020         Patient: Virginia Guzman   YOB: 1980   Date of Visit: 12/16/2020           To Whom it May Concern:    Virginia Guzman was seen in my clinic on 12/16/2020. She may return to work 12/17/2020.    If you have any questions or concerns, please don't hesitate to call.        Sincerely,           Dayanna Saldivar P.A.-C.  Electronically Signed

## 2020-12-16 NOTE — TELEPHONE ENCOUNTER
"Patient developed severe piercing chest pain on Friday while at work. It has been intermittent, lasting only seconds, described as piercing. She has not wanted to seek care due to COVID. I advised her to go to [facility] as she is in [place].     Reason for Disposition  • Intermittent chest pain and pain has been increasing in severity or frequency    Additional Information  • Negative: SEVERE difficulty breathing (e.g., struggling for each breath, speaks in single words)  • Negative: Passed out (i.e., fainted, collapsed and was not responding)  • Negative: Chest pain lasting longer than 5 minutes and ANY of the following:* Over 50 years old* Over 30 years old and at least one cardiac risk factor (i.e., high blood pressure, diabetes, high cholesterol, obesity, smoker or strong family history of heart disease)* Pain is crushing, pressure-like, or heavy * Took nitroglycerin and chest pain was not relieved* History of heart disease (i.e., angina, heart attack, bypass surgery, angioplasty, CHF)  • Negative: Visible sweat on face or sweat dripping down face  • Negative: Sounds like a life-threatening emergency to the triager  • Negative: Followed an injury to chest  • Negative: SEVERE chest pain  • Negative: Pain also present in shoulder(s) or arm(s) or jaw  • Negative: Difficulty breathing  • Negative: Cocaine use within last 3 days  • Negative: History of prior 'blood clot' in leg or lungs (i.e., deep vein thrombosis, pulmonary embolism)  • Negative: Recent illness requiring prolonged bed rest (i.e., immobilization)  • Negative: Hip or leg fracture in past 2 months (e.g, or had cast on leg or ankle)  • Negative: Major surgery in the past month  • Negative: Recent long-distance travel with prolonged time in car, bus, plane, or train (i.e., within past 2 weeks; 6 or more hours duration)  • Negative: Heart beating irregularly or very rapidly    Answer Assessment - Initial Assessment Questions  1. LOCATION: \"Where does it hurt?\"  " "      Upper left  2. RADIATION: \"Does the pain go anywhere else?\" (e.g., into neck, jaw, arms, back)      Left armpit  3. ONSET: \"When did the chest pain begin?\" (Minutes, hours or days)       Friday at work  4. PATTERN \"Does the pain come and go, or has it been constant since it started?\"  \"Does it get worse with exertion?\"       Comes and goes. It is not worse with exertion  5. DURATION: \"How long does it last\" (e.g., seconds, minutes, hours)      3-5 seconds  6. SEVERITY: \"How bad is the pain?\"  (e.g., Scale 1-10; mild, moderate, or severe)     - MILD (1-3): doesn't interfere with normal activities      - MODERATE (4-7): interferes with normal activities or awakens from sleep     - SEVERE (8-10): excruciating pain, unable to do any normal activities        SEVERE at first and then eases away, piercing pain  7. CARDIAC RISK FACTORS: \"Do you have any history of heart problems or risk factors for heart disease?\" (e.g., prior heart attack, angina; high blood pressure, diabetes, being overweight, high cholesterol, smoking, or strong family history of heart disease)      none  8. PULMONARY RISK FACTORS: \"Do you have any history of lung disease?\"  (e.g., blood clots in lung, asthma, emphysema, birth control pills)      none  9. CAUSE: \"What do you think is causing the chest pain?\"      unknown  10. OTHER SYMPTOMS: \"Do you have any other symptoms?\" (e.g., dizziness, nausea, vomiting, sweating, fever, difficulty breathing, cough)        Dizziness when the pain comes, chills after pain subsides  11. PREGNANCY: \"Is there any chance you are pregnant?\" \"When was your last menstrual period?\"        No, tubal ligation    Protocols used: CHEST PAIN-A-OH      "

## 2020-12-20 DIAGNOSIS — R07.9 CHEST PAIN, UNSPECIFIED TYPE: ICD-10-CM

## 2020-12-20 NOTE — PROGRESS NOTES
Palma Saldivar P.A.-C.   Cc: P Telemedicine Scheduling             Good Morning,     Thank you for this referral.     This patient is scheduled for New Telehealth appointment on 01/06/2021 with Dr. Conklin and the Cardiologist requires an EKG completed either within 1-2 days prior or at the time of check in for this appointment. Please place order in [facility] and contact patient for scheduling.  Patient aware this will need to be completed.     Please let us know if you have any questions,   Palma Glez   [phone]

## 2020-12-29 ENCOUNTER — TELEPHONE (OUTPATIENT)
Dept: CARDIOLOGY | Facility: MEDICAL CENTER | Age: 40
End: 2020-12-29

## 2020-12-30 ENCOUNTER — TELEPHONE (OUTPATIENT)
Dept: CARDIOLOGY | Facility: MEDICAL CENTER | Age: 40
End: 2020-12-30

## 2020-12-31 ENCOUNTER — TELEPHONE (OUTPATIENT)
Dept: CARDIOLOGY | Facility: MEDICAL CENTER | Age: 40
End: 2020-12-31

## 2020-12-31 DIAGNOSIS — R07.89 OTHER CHEST PAIN: ICD-10-CM

## 2020-12-31 NOTE — TELEPHONE ENCOUNTER
----- Message from Martha Toledo sent at 12/31/2020  9:42 AM PST -----  Regarding: EKG order for Dr. Conklin's Telemed 1/6/2021  Please provide an EKG order today by 12noon for this patient's Telemed appointment.  Thanks, Martha

## 2021-01-06 ENCOUNTER — TELEMEDICINE2 (OUTPATIENT)
Dept: CARDIOLOGY | Facility: MEDICAL CENTER | Age: 41
End: 2021-01-06
Payer: COMMERCIAL

## 2021-01-06 VITALS
RESPIRATION RATE: 16 BRPM | BODY MASS INDEX: 32.62 KG/M2 | HEIGHT: 66 IN | OXYGEN SATURATION: 98 % | DIASTOLIC BLOOD PRESSURE: 72 MMHG | SYSTOLIC BLOOD PRESSURE: 110 MMHG | WEIGHT: 203 LBS | TEMPERATURE: 98.2 F | HEART RATE: 69 BPM

## 2021-01-06 DIAGNOSIS — R07.2 PRECORDIAL PAIN: ICD-10-CM

## 2021-01-06 PROCEDURE — 99203 OFFICE O/P NEW LOW 30 MIN: CPT | Mod: 95,CR | Performed by: INTERNAL MEDICINE

## 2021-01-06 ASSESSMENT — ENCOUNTER SYMPTOMS
FEVER: 0
MYALGIAS: 0
HEARTBURN: 0
SHORTNESS OF BREATH: 0
VOMITING: 0
NERVOUS/ANXIOUS: 0
CHILLS: 0
NAUSEA: 0
HEMOPTYSIS: 0
BRUISES/BLEEDS EASILY: 0
COUGH: 0
DIZZINESS: 0

## 2021-01-06 NOTE — PROGRESS NOTES
Telemedicine: New Patient   This evaluation was conducted via [employer] using secure and encrypted videoconferencing technology. The patient was physically located at [facility] in [place], [place]. The patient was presented by a medical professional at the originating site.   The patient's identity was confirmed and verbal consent was obtained for this telemedicine encounter.    Subjective:     CC:   Chief Complaint   Patient presents with   • Chest Pain     new patient       Virginia Guzman is a 40 y.o. female presenting to establish care and to discuss the evaluation and management of: above issue    The patient is in consultation request of Dayanna Saldivar for chest pain .  She is 40 years old female with no known major medical problem.  Her mother had coronary artery disease/MI at age 45. She is however a heavy smoker.  A few weeks ago she was experiencing sharp shooting pain from the chest going across to the left axillary area sting about 45 seconds at a time. It was worse with inspiration and better when she leaned forward.  She had mild cold symptoms a couple weeks prior to that.  She had no recent change in her exercise tolerance.  She walks up 8-9 flights of stairs at work routinely without any issue.  The symptoms also have mostly resolved.    She does not smoke cigarettes.    Has not had any cholesterol test in the past.    Review of Systems   Constitutional: Negative for chills and fever.   HENT: Negative for ear discharge and nosebleeds.    Respiratory: Negative for cough, hemoptysis and shortness of breath.    Cardiovascular: Positive for chest pain. Negative for leg swelling.   Gastrointestinal: Negative for heartburn, nausea and vomiting.   Musculoskeletal: Negative for myalgias.   Skin: Negative for rash.   Neurological: Negative for dizziness.   Endo/Heme/Allergies: Does not bruise/bleed easily.   Psychiatric/Behavioral: The patient is not nervous/anxious.    All other systems reviewed and are  "negative.        No Known Allergies    Current medicines (including changes today)  No current outpatient medications on file.     No current facility-administered medications for this visit.        She  has no past medical history of Hyperlipidemia or Hypertension.  She  has a past surgical history that includes primary c section.      Family History   Problem Relation Age of Onset   • Heart Attack Mother      Family Status   Relation Name Status   • Mo  (Not Specified)       Patient Active Problem List    Diagnosis Date Noted   • Precordial pain 01/06/2021          Objective:   /72 (BP Location: Right arm, Patient Position: Sitting)   Pulse 69   Temp 36.8 °C (98.2 °F)   Resp 16   Ht 1.676 m (5' 6\")   Wt 92.1 kg (203 lb)   SpO2 98%   BMI 32.77 kg/m²     Physical Exam   Constitutional: She is oriented to person, place, and time. No distress.   HENT:   Head: Normocephalic and atraumatic.   Eyes: EOM are normal.   Neck: No JVD present.   Cardiovascular: Normal rate and regular rhythm.   No murmur heard.  Pulmonary/Chest: Effort normal and breath sounds normal.   Musculoskeletal:         General: No edema.   Neurological: She is alert and oriented to person, place, and time.   Psychiatric: Affect and judgment normal.     EKG from #16 and today by my review both were normal.    Assessment and Plan:   The following treatment plan was discussed:     1. Precordial pain    I had a lengthy discussion with her.  Her chest pain is typical and probably more from pleurisy or musculoskeletal.  The likelihood of significant cardiovascular issue particularly arthrosclerosis is low.  We decided on expectant management for now presently chest pain recurred we will arrange for some form of stress test.  She is agreeable with the plan.   She is to follow-up with her primary care provider and to contact us on an as-needed basis.  Thank you for allowing us to participate in care of the patient.           "

## 2021-01-06 NOTE — LETTER
[facility]   [address] [place] [address]  [place] [address]  Phone: [phone]  Fax: [phone]              Virginia Guzman  1980    Encounter Date: 1/6/2021    Mallory Conklin M.D.          PROGRESS NOTE:  No notes on file        No Recipients

## 2022-03-14 ENCOUNTER — TELEPHONE (OUTPATIENT)
Dept: SCHEDULING | Facility: IMAGING CENTER | Age: 42
End: 2022-03-14

## 2022-04-14 ENCOUNTER — APPOINTMENT (OUTPATIENT)
Dept: RADIOLOGY | Facility: IMAGING CENTER | Age: 42
End: 2022-04-14
Attending: NURSE PRACTITIONER
Payer: COMMERCIAL

## 2022-04-14 ENCOUNTER — OFFICE VISIT (OUTPATIENT)
Dept: URGENT CARE | Facility: PHYSICIAN GROUP | Age: 42
End: 2022-04-14
Payer: COMMERCIAL

## 2022-04-14 VITALS
BODY MASS INDEX: 35.65 KG/M2 | HEIGHT: 65 IN | SYSTOLIC BLOOD PRESSURE: 136 MMHG | HEART RATE: 80 BPM | WEIGHT: 214 LBS | DIASTOLIC BLOOD PRESSURE: 78 MMHG | TEMPERATURE: 97.4 F | OXYGEN SATURATION: 97 % | RESPIRATION RATE: 20 BRPM

## 2022-04-14 DIAGNOSIS — S46.912A STRAIN OF LEFT SHOULDER, INITIAL ENCOUNTER: ICD-10-CM

## 2022-04-14 DIAGNOSIS — S43.402A SPRAIN OF LEFT SHOULDER, UNSPECIFIED SHOULDER SPRAIN TYPE, INITIAL ENCOUNTER: ICD-10-CM

## 2022-04-14 PROCEDURE — 99214 OFFICE O/P EST MOD 30 MIN: CPT | Performed by: NURSE PRACTITIONER

## 2022-04-14 PROCEDURE — 73030 X-RAY EXAM OF SHOULDER: CPT | Mod: TC,FY,LT | Performed by: NURSE PRACTITIONER

## 2022-04-14 RX ORDER — IBUPROFEN 800 MG/1
800 TABLET ORAL EVERY 8 HOURS PRN
Qty: 30 TABLET | Refills: 0 | Status: SHIPPED | OUTPATIENT
Start: 2022-04-14 | End: 2023-03-28

## 2022-04-14 ASSESSMENT — ENCOUNTER SYMPTOMS
CONSTITUTIONAL NEGATIVE: 1
NEUROLOGICAL NEGATIVE: 1

## 2022-04-14 ASSESSMENT — PAIN SCALES - GENERAL: PAINLEVEL: 8=MODERATE-SEVERE PAIN

## 2022-04-14 ASSESSMENT — VISUAL ACUITY: OU: 1

## 2022-04-14 NOTE — PROGRESS NOTES
"Subjective:     Virginia Guzman is a 42 y.o. female who presents for Shoulder Injury and Shoulder Pain (\"Shoulder popped out\" while walking her dog yesterday. popped  should \"back in\" pt states she has very little ROM. And in a lot of pain.  )       Shoulder Injury   The left shoulder is affected.   Shoulder Pain  This is a new problem.     Patient was walking her dog yesterday when she felt her left shoulder pop.  Reports  reduced it.  Her symptoms improved immediately after that.  Had full range of motion of her shoulder with minimal pain.  However, today, writing to notice worsening left shoulder pain, decreased range of motion, and tenderness.    Patient was screened prior to rooming and denied COVID-19 diagnosis or contact with a person who has been diagnosed or is suspected to have COVID-19. During this visit, appropriate PPE was worn, hand hygiene was performed, and the patient and any visitors were masked.     PMH:  has no past medical history of Hyperlipidemia or Hypertension.    MEDS:   Current Outpatient Medications:   •  cyclobenzaprine (FLEXERIL) 10 mg Tab, Take 1 Tablet by mouth every 8 hours as needed for Muscle Spasms., Disp: 30 Tablet, Rfl: 0  •  ibuprofen (MOTRIN) 800 MG Tab, Take 1 Tablet by mouth every 8 hours as needed for Moderate Pain, Fever or Inflammation., Disp: 30 Tablet, Rfl: 0    ALLERGIES: No Known Allergies    SURGHX:   Past Surgical History:   Procedure Laterality Date   • PRIMARY C SECTION       SOCHX:  reports that she has been smoking. She started smoking about 25 years ago. She has been smoking about 0.00 packs per day. She has never used smokeless tobacco. She reports previous alcohol use. She reports that she does not use drugs.     FH: Reviewed with patient, not pertinent to this visit.    Review of Systems   Constitutional: Negative.    Musculoskeletal:        Left shoulder injury, pain   Neurological: Negative.    All other systems reviewed and are " "negative.    Additional details per HPI.      Objective:     /78   Pulse 80   Temp 36.3 °C (97.4 °F) (Temporal)   Resp 20   Ht 1.651 m (5' 5\")   Wt 97.1 kg (214 lb)   SpO2 97%   BMI 35.61 kg/m²     Physical Exam  Vitals reviewed.   Constitutional:       General: She is not in acute distress.     Appearance: She is well-developed. She is not ill-appearing or toxic-appearing.   Eyes:      General: Vision grossly intact.      Extraocular Movements: Extraocular movements intact.   Cardiovascular:      Rate and Rhythm: Normal rate.   Pulmonary:      Effort: Pulmonary effort is normal. No respiratory distress.   Musculoskeletal:         General: No deformity.      Left shoulder: Tenderness present. No swelling or deformity. Decreased range of motion. Normal strength.      Cervical back: Normal range of motion.   Skin:     General: Skin is warm and dry.      Coloration: Skin is not pale.      Findings: No bruising, erythema or rash.   Neurological:      Mental Status: She is alert and oriented to person, place, and time.      Sensory: No sensory deficit.      Motor: No weakness.   Psychiatric:         Behavior: Behavior normal. Behavior is cooperative.     X-ray of let shoulder:    Details    Reading Physician Reading Date Result Priority   Alex Smith M.D.  [phone] 4/14/2022 Urgent Care     Narrative & Impression     4/14/2022 3:44 PM     HISTORY/REASON FOR EXAM:  Pain/Deformity Following Trauma    TECHNIQUE/EXAM DESCRIPTION AND NUMBER OF VIEWS:  3 views of the LEFT shoulder.     COMPARISON: None     FINDINGS:  There is no evidence of fracture or dislocation. Glenohumeral and acromioclavicular articulation is maintained.  No periosteal new bone formation or osseous erosion is identified. Visualized left lung field is clear.    IMPRESSION:     No evidence of acute fracture or dislocation.           Exam Ended: 04/14/22  4:00 PM Last Resulted: 04/14/22  4:04 PM           Radiology report and images " reviewed by myself. Concur with findings.      Assessment/Plan:     1. Sprain of left shoulder, unspecified shoulder sprain type, initial encounter  - DX-SHOULDER 2+ LEFT; Future  - Referral to Sports Medicine  - ibuprofen (MOTRIN) 800 MG Tab; Take 1 Tablet by mouth every 8 hours as needed for Moderate Pain, Fever or Inflammation.  Dispense: 30 Tablet; Refill: 0  - cyclobenzaprine (FLEXERIL) 10 mg Tab; Take 1 Tablet by mouth every 8 hours as needed for Muscle Spasms.  Dispense: 30 Tablet; Refill: 0    2. Strain of left shoulder, initial encounter  - Referral to Sports Medicine  - ibuprofen (MOTRIN) 800 MG Tab; Take 1 Tablet by mouth every 8 hours as needed for Moderate Pain, Fever or Inflammation.  Dispense: 30 Tablet; Refill: 0  - cyclobenzaprine (FLEXERIL) 10 mg Tab; Take 1 Tablet by mouth every 8 hours as needed for Muscle Spasms.  Dispense: 30 Tablet; Refill: 0    Rx as above sent electronically.    Rest, ice, compression, and elevation (RICE) and over-the-counter acetaminophen, per 's instructions, as needed for pain. Sling applied.    Work note provided with physical restrictions.    Follow up with sports medicine; referral placed.    Differential diagnosis, natural history, supportive care, over-the-counter symptom management per 's instructions, close monitoring, and indications for immediate follow-up discussed.     All questions answered. Patient agrees with the plan of care.    Discharge summary provided.

## 2022-04-14 NOTE — PATIENT INSTRUCTIONS
Details    Reading Physician Reading Date Result Priority   Alex Smith M.D.  [phone] 4/14/2022 Urgent Care     Narrative & Impression     4/14/2022 3:44 PM     HISTORY/REASON FOR EXAM:  Pain/Deformity Following Trauma        TECHNIQUE/EXAM DESCRIPTION AND NUMBER OF VIEWS:  3 views of the LEFT shoulder.     COMPARISON: None     FINDINGS:  There is no evidence of fracture or dislocation. Glenohumeral and acromioclavicular articulation is maintained.  No periosteal new bone formation or osseous erosion is identified. Visualized left lung field is clear.        IMPRESSION:     No evidence of acute fracture or dislocation.             Exam Ended: 04/14/22  4:00 PM Last Resulted: 04/14/22  4:04 PM             Shoulder Sprain    A shoulder sprain is a partial or complete tear in one of the tough, fiber-like tissues (ligaments) in the shoulder. The ligaments in the shoulder help to hold the shoulder in place.  What are the causes?  This condition may be caused by:  · A fall.  · A hit to the shoulder.  · A twist of the arm.  What increases the risk?  You are more likely to develop this condition if you:  · Play sports.  · Have problems with balance or coordination.  What are the signs or symptoms?  Symptoms of this condition include:  · Pain when moving the shoulder.  · Limited ability to move the shoulder.  · Swelling and tenderness on top of the shoulder.  · Warmth in the shoulder.  · A change in the shape of the shoulder.  · Redness or bruising on the shoulder.  How is this diagnosed?  This condition is diagnosed with:  · A physical exam. During the exam, you may be asked to do simple exercises with your shoulder.  · Imaging tests such as X-rays, MRI, or a CT scan. These tests can show how severe the sprain is.  How is this treated?  This condition may be treated with:  · Rest.  · Pain medicine.  · Ice.  · A sling or brace. This is used to keep the arm still while the shoulder is healing.  · Physical therapy  or rehabilitation exercises. These help to improve the range of motion and strength of the shoulder.  · Surgery (rare). Surgery may be needed if the sprain caused a joint to become unstable. Surgery may also be needed to reduce pain.  Some people may develop ongoing shoulder pain or lose some range of motion in the shoulder. However, most people do not develop long-term problems.  Follow these instructions at home:    If you have a sling or brace:  · Wear the sling or brace as told by your health care provider. Remove it only as told by your health care provider.  · Loosen the sling or brace if your fingers tingle, become numb, or turn cold and blue.  · Keep the sling or brace clean.  · If the sling or brace is not waterproof:  ? Do not let it get wet.  ? Cover it with a watertight covering when you take a bath or shower.  Activity  · Rest your shoulder.  · Move your arm only as much as told by your health care provider, but move your hand and fingers often to prevent stiffness and swelling.  · Return to your normal activities as told by your health care provider. Ask your health care provider what activities are safe for you.  · Ask your health care provider when it is safe for you to drive if you have a sling or brace on your shoulder.  · If you were shown how to do any exercises, do them as told by your health care provider.  General instructions  · If directed, put ice on the affected area.  ? Put ice in a plastic bag.  ? Place a towel between your skin and the bag.  ? Leave the ice on for 20 minutes, 2-3 times a day.  · Take over-the-counter and prescription medicines only as told by your health care provider.  · Do not use any products that contain nicotine or tobacco, such as cigarettes, e-cigarettes, and chewing tobacco. These can delay healing. If you need help quitting, ask your health care provider.  · Keep all follow-up visits as told by your health care provider. This is important.  Contact a health  care provider if:  · Your pain gets worse.  · Your pain is not relieved with medicines.  · You have increased redness or swelling.  Get help right away if:  · You have a fever.  · You cannot move your arm or shoulder.  · You develop severe numbness or tingling in your arm, hand, or fingers.  · Your arm, hand, or fingers feel cold and turn blue, white, or gray.  Summary  · A shoulder sprain is a partial or complete tear in one of the tough, fiber-like tissues (ligaments) in the shoulder.  · This condition may be caused by a fall, a hit to the shoulder, or a twist of the arm.  · Treatment usually includes rest, ice, and pain medicine as needed.  · If you have a sling or brace, wear it as told by your health care provider. Remove it only as told by your health care provider.  This information is not intended to replace advice given to you by your health care provider. Make sure you discuss any questions you have with your health care provider.  Document Released: 05/06/2010 Document Revised: 05/24/2019 Document Reviewed: 05/24/2019  [employer] Patient Education © 2020 [employer]      A referral request has been placed for sports medicine. We have a referrals department that is tasked with locating a suitable office that currently accepts patients and your insurance and you should be receiving referral information from them such as the office name, address, and number. This process usually takes around 3 business days. If you are not contacted by our referrals department, please call [phone].

## 2022-04-14 NOTE — LETTER
April 14, 2022         Patient: Virginia Guzman   YOB: 1980   Date of Visit: 4/14/2022           To Whom it May Concern:    Virginia Guzman was seen in my clinic on 4/14/2022 due to illness/injury involving her left shoulder. Due to medical necessity, patient is advised of the following physical work restrictions:    · No lifting, pushing, or pulling with the left upper extremity  · No reach above or below with the left upper extremity  · Must keep left arm in sling    Restrictions in effect for the next 2 weeks or until as advised by sports medicine.     If you have any questions or concerns, please don't hesitate to call.        Sincerely,         FELIPE Vora.  Electronically Signed

## 2022-04-15 RX ORDER — CYCLOBENZAPRINE HCL 10 MG
10 TABLET ORAL EVERY 8 HOURS PRN
Qty: 30 TABLET | Refills: 0 | Status: SHIPPED | OUTPATIENT
Start: 2022-04-15 | End: 2023-03-28

## 2022-04-25 ENCOUNTER — OFFICE VISIT (OUTPATIENT)
Dept: SPORTS MEDICINE | Facility: CLINIC | Age: 42
End: 2022-04-25
Payer: COMMERCIAL

## 2022-04-25 VITALS
HEIGHT: 65 IN | OXYGEN SATURATION: 96 % | TEMPERATURE: 98.7 F | WEIGHT: 214 LBS | RESPIRATION RATE: 18 BRPM | BODY MASS INDEX: 35.65 KG/M2 | DIASTOLIC BLOOD PRESSURE: 80 MMHG | HEART RATE: 100 BPM | SYSTOLIC BLOOD PRESSURE: 118 MMHG

## 2022-04-25 DIAGNOSIS — M25.512 ACUTE PAIN OF LEFT SHOULDER: ICD-10-CM

## 2022-04-25 DIAGNOSIS — S43.005A: ICD-10-CM

## 2022-04-25 DIAGNOSIS — M24.812 INTERNAL DERANGEMENT OF SHOULDER, LEFT: ICD-10-CM

## 2022-04-25 DIAGNOSIS — M25.612 DECREASED RANGE OF MOTION OF SHOULDER, LEFT: ICD-10-CM

## 2022-04-25 PROCEDURE — 99213 OFFICE O/P EST LOW 20 MIN: CPT | Performed by: FAMILY MEDICINE

## 2022-04-25 NOTE — Clinical Note
Saran Arce, Thank you for referring Virginia to our sports medicine clinic. Given her history of traumatic acute dislocation of the LEFT shoulder we have recommended formal physical therapy, but we are going to obtain an MRI to assess for internal damage (labral tear). Since it is her dominant shoulder, I will see how she does over the next few weeks... Hope you are well! L

## 2022-04-25 NOTE — PROGRESS NOTES
CHIEF COMPLAINT:  Virginia Guzman female presenting at the request of DEANNE Vora  for evaluation of Shoulder pain.     Virginia Guzman is complaining of left shoulder pain (L handed)  Date of injury, April 13, 2022  Mechanism of injury, shoulder popped out while walking her dog  Her  popped it back in  Unable to raise the arm the following day  Pain is at the deltoid region and Deep in the glenohumeral joint  Quality is burning, sharp  Pain is Non-radiating  Aggravated by movement, reaching back for bra strap, unable to do her haur  Improved with  rest   no prior problems with this shoulder in the past  Prior Treatments: Seen at urgent care  Prior studies: X-Ray   Medications tried for pain include: acetaminophen, ibuprofen (OTC), cyclobenzaprine at bedtime  Mechanical Symptom history: No Locking, POSITIVE grinding    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 No pertinent past medical history.    PMH:  has no past medical history of Hyperlipidemia or Hypertension.  MEDS:   Current Outpatient Medications:   •  cyclobenzaprine (FLEXERIL) 10 mg Tab, Take 1 Tablet by mouth every 8 hours as needed for Muscle Spasms., Disp: 30 Tablet, Rfl: 0  •  ibuprofen (MOTRIN) 800 MG Tab, Take 1 Tablet by mouth every 8 hours as needed for Moderate Pain, Fever or Inflammation., Disp: 30 Tablet, Rfl: 0  ALLERGIES: No Known Allergies  SURGHX:   Past Surgical History:   Procedure Laterality Date   • PRIMARY C SECTION       SOCHX:  reports that she has been smoking. She started smoking about 25 years ago. She has been smoking about 0.00 packs per day. She has never used smokeless tobacco. She reports previous alcohol use. She reports that she does not use drugs.  FH: Family history was reviewed, no  "pertinent findings to report     PHYSICAL EXAM:  /80 (BP Location: Left arm, Patient Position: Sitting, BP Cuff Size: Adult)   Pulse 100   Temp 37.1 °C (98.7 °F) (Temporal)   Resp 18   Ht 1.651 m (5' 5\")   Wt 97.1 kg (214 lb)   SpO2 96%   BMI 35.61 kg/m²      well-developed, well-nourished in no apparent distress, alert and oriented x 3.  Gait: normal    Cervical spine:  Range of motion Intact  Spurling's testing is POSITIVE with pain radiating into the shoulder and eve-scapular region on the LEFT  Cervical spine tenderness NEGATIVE    Strength testing:     Deltoid, bilateral 5/5  Bicep, bilateral 5/5  Tricep, bilateral 5/5  Wrist Extension, bilateral 5/5  Wrist Flexion, bilateral 5/5  Finger Abduction, bilateral 5/5    Sensation:  INTACT Bilaterally         Reflexes:   Biceps: R 2+/L 2+  Triceps: R 2+/L  2+  Brachial radialis R 2+/L  2+  Harmon's testing is NEGATIVE  The arms are otherwise neurovascularly intact     Shoulder Exam:    RIGHT Shoulder:  No visible swelling   Range of motion INTACT  Tenderness: Non-tender  Empty Can Testing 5/5  Internal Rotation 5/5  External Rotation 5/5  Lift Off Testing 5/5  Impingement testing Artis  NEGATIVE  Neer's testing NEGATIVE  Apprehension testing NEGATIVE    LEFT Shoulder:  No visible swelling   Range of motion DIMINISHED with pain  Tenderness: Anterior tenderness  Empty Can Testing 5/5  Internal Rotation 5/5 with pain  External Rotation 5/5  Lift Off Testing 5/5  Impingement testing Artis  POSITIVE  Neer's testing POSITIVE  Apprehension testing POSITIVE  Relocation testing POSITIVE    Additional Findings: None      1. Closed traumatic dislocation of shoulder, left, initial encounter  MR-SHOULDER-W/O LEFT    Referral to Physical Therapy   2. Acute pain of left shoulder  MR-SHOULDER-W/O LEFT    Referral to Physical Therapy   3. Decreased range of motion of shoulder, left  MR-SHOULDER-W/O LEFT    Referral to Physical Therapy   4. 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   To discuss MRI results and further management options           4/14/2022 3:44 PM     HISTORY/REASON FOR EXAM:  Pain/Deformity Following Trauma        TECHNIQUE/EXAM DESCRIPTION AND NUMBER OF VIEWS:  3 views of the LEFT shoulder.     COMPARISON: None     FINDINGS:  There is no evidence of fracture or dislocation. Glenohumeral and acromioclavicular articulation is maintained.  No periosteal new bone formation or osseous erosion is identified. Visualized left lung field is clear.        IMPRESSION:     No evidence of acute fracture or dislocation.             Exam Ended: 04/14/22  4:00 PM Last Resulted: 04/14/22  4:04 PM           Interpreted in the office today with the patient    Thank you DEANNE Vora for allowing me to participate in caring for your patient.

## 2022-04-25 NOTE — LETTER
April 25, 2022         Patient: Virginia Guzman   YOB: 1980   Date of Visit: 4/25/2022           To Whom it May Concern:    Virginia Guzman was seen in my clinic on 4/25/2022. She may return to work, but due to limited use of the LEFT upper extremity, recommend avoiding greater than 15 pounds and avoiding any overhead activity until re-evaluation in 2 weeks.    If you have any questions or concerns, please don't hesitate to call.        Sincerely,           Arthur Scott M.D.  Electronically Signed

## 2022-05-05 ENCOUNTER — HOSPITAL ENCOUNTER (OUTPATIENT)
Dept: RADIOLOGY | Facility: MEDICAL CENTER | Age: 42
End: 2022-05-05
Attending: FAMILY MEDICINE
Payer: COMMERCIAL

## 2022-05-05 DIAGNOSIS — M25.512 ACUTE PAIN OF LEFT SHOULDER: ICD-10-CM

## 2022-05-05 DIAGNOSIS — M24.812 INTERNAL DERANGEMENT OF SHOULDER, LEFT: ICD-10-CM

## 2022-05-05 DIAGNOSIS — M25.612 DECREASED RANGE OF MOTION OF SHOULDER, LEFT: ICD-10-CM

## 2022-05-05 DIAGNOSIS — S43.005A: ICD-10-CM

## 2022-05-05 PROCEDURE — 73221 MRI JOINT UPR EXTREM W/O DYE: CPT | Mod: LT

## 2022-05-06 ENCOUNTER — OFFICE VISIT (OUTPATIENT)
Dept: SPORTS MEDICINE | Facility: CLINIC | Age: 42
End: 2022-05-06
Payer: COMMERCIAL

## 2022-05-06 VITALS — WEIGHT: 214 LBS | HEIGHT: 65 IN | BODY MASS INDEX: 35.65 KG/M2

## 2022-05-06 DIAGNOSIS — S43.005A: ICD-10-CM

## 2022-05-06 DIAGNOSIS — S43.432A SUPERIOR LABRUM ANTERIOR-TO-POSTERIOR (SLAP) TEAR OF LEFT SHOULDER: ICD-10-CM

## 2022-05-06 DIAGNOSIS — M25.512 ACUTE PAIN OF LEFT SHOULDER: ICD-10-CM

## 2022-05-06 PROCEDURE — 99213 OFFICE O/P EST LOW 20 MIN: CPT | Performed by: FAMILY MEDICINE

## 2022-05-06 NOTE — PROGRESS NOTES
"Chief Complaint   Patient presents with   • Shoulder Pain     F/V MRI results        Subjective       Virginia Guzman is complaining of left shoulder pain (L handed)  Date of injury, April 13, 2022  Mechanism of injury, shoulder popped out while walking her dog  Her  popped it back in    50-60% improved since her injury  she is still having difficulty with shoulder flexion anywhere near 90 degrees  She has to repetitively lift at work which is exacerbating the shoulder  She has difficulty sleeping on the LEFT side (which is normally the side she sleeps on)  Intermittent clicking    Objective     Ht 1.651 m (5' 5\")   Wt 97.1 kg (214 lb)   BMI 35.61 kg/m²     LEFT Shoulder:  No visible swelling   Range of motion DIMINISHED with pain  Tenderness: Anterior tenderness  Empty Can Testing 5/5  Internal Rotation 5/5 with pain  External Rotation 5/5     1. Superior labrum anterior-to-posterior (SLAP) tear of left shoulder  Referral to Orthopedics   2. Closed traumatic dislocation of shoulder, left, initial encounter     3. Acute pain of left shoulder  Referral to Orthopedics     Date of injury, April 13, 2022  Mechanism of injury, shoulder popped out while walking her dog  Her  popped it back in    We will see how she does with formal physical therapy  We did refer her for orthopedic consultation in the event she fails nonoperative management since this is her dominant shoulder and she has exacerbation of her pain with repetitive activity at work    Orthopedic consultation placed  [facility] referral placed at initial visit and has been approved        5/5/2022 7:20 AM     HISTORY/REASON FOR EXAM:  Shoulder trauma, instability. Dislocation 2 weeks ago     TECHNIQUE/EXAM DESCRIPTION:  MRI of the LEFT shoulder without contrast.     The study was performed on a [employer] 1.5 Dayna MRI scanner.     T1 sagittal, fast spin-echo T2 fat-suppressed oblique coronal, sagittal, and axial and intermediate " fast spin-echo oblique coronal images were obtained.     COMPARISON: 4/14/2022 radiographs     FINDINGS:  Glenohumeral joint space: There is  a physiologic amount of joint fluid.     Rotator cuff:  No tear, tendinopathy or atrophy.     Labrum:  On a single axial slice, 15, there is high signal crossing the posterior superior labrum. This is not confirmed in the other sequences. No adjacent cyst. There is no joint fluid to assist with the evaluation     The long head biceps is within normal limits.     Osseous structures/cartilage:  No acute fracture is seen. No focal cartilage defect or glenohumeral osteophyte formation. Normal acromioclavicular joint.     IMPRESSION:     No MR evidence of prior dislocation or tendinopathy     Slight increased posterior superior labral signal is more likely the normal fibrous and hyaline cartilage junction favored over small SLAP. If there is high clinical concern, shoulder MR arthrogram may help clarify             Exam Ended: 05/05/22  8:01 AM Last Resulted: 05/05/22  9:44 AM

## 2022-05-06 NOTE — LETTER
May 6, 2022         Patient: Virginia Guzman   YOB: 1980   Date of Visit: 5/6/2022           To Whom it May Concern:    Virginia Guzman was seen in my clinic on 5/6/2022. She still has limited use of the LEFT upper extremity, recommend avoiding greater than 15 pounds and avoiding any overhead activity until re-evaluation in 3 weeks.    If you have any questions or concerns, please don't hesitate to call.        Sincerely,           Arthur Scott M.D.  Electronically Signed

## 2022-06-06 ENCOUNTER — OFFICE VISIT (OUTPATIENT)
Dept: SPORTS MEDICINE | Facility: CLINIC | Age: 42
End: 2022-06-06
Payer: COMMERCIAL

## 2022-06-06 VITALS
TEMPERATURE: 98.3 F | DIASTOLIC BLOOD PRESSURE: 76 MMHG | HEIGHT: 65 IN | RESPIRATION RATE: 16 BRPM | WEIGHT: 214 LBS | HEART RATE: 101 BPM | SYSTOLIC BLOOD PRESSURE: 118 MMHG | BODY MASS INDEX: 35.65 KG/M2 | OXYGEN SATURATION: 96 %

## 2022-06-06 DIAGNOSIS — S43.005A: ICD-10-CM

## 2022-06-06 DIAGNOSIS — S43.432A SUPERIOR LABRUM ANTERIOR-TO-POSTERIOR (SLAP) TEAR OF LEFT SHOULDER: ICD-10-CM

## 2022-06-06 DIAGNOSIS — M25.512 ACUTE PAIN OF LEFT SHOULDER: ICD-10-CM

## 2022-06-06 PROCEDURE — 7101 PR PHYSICAL: Performed by: FAMILY MEDICINE

## 2022-06-06 NOTE — PROGRESS NOTES
"Chief Complaint   Patient presents with   • Shoulder Pain     F/V L shoulder pain      Subjective     Virginia Guzman is complaining of left shoulder pain (L handed)  Date of injury, April 13, 2022  Mechanism of injury, shoulder popped out while walking her dog  Her  popped it back in    50-60% improved since her injury  she is still having difficulty with shoulder flexion anywhere near 90 degrees  She has to repetitively lift at work which is exacerbating the shoulder  She has difficulty sleeping on the LEFT side (which is normally the side she sleeps on)  Intermittent clicking    Objective     /76 (BP Location: Right arm, Patient Position: Sitting, BP Cuff Size: Large adult)   Pulse (!) 101   Temp 36.8 °C (98.3 °F) (Temporal)   Resp 16   Ht 1.651 m (5' 5\")   Wt 97.1 kg (214 lb)   SpO2 96%   BMI 35.61 kg/m²       LEFT Shoulder:  No visible swelling   Range of motion DIMINISHED with pain  Tenderness: Anterior tenderness  Empty Can Testing 5/5  Internal Rotation 5/5 with pain  External Rotation 5/5     1. Superior labrum anterior-to-posterior (SLAP) tear of left shoulder     2. Closed traumatic dislocation of shoulder, left, initial encounter     3. Acute pain of left shoulder         Date of injury, April 13, 2022  Mechanism of injury, shoulder popped out while walking her dog  Her  popped it back in    We will see how she does with formal physical therapy ([facility]) ordered by Chase Pino MD (sports medicine orthopedic specialist)    She did undergo corticosteroid injection performed by Dr. Pino at her visit on May 17, 2020    Return in about 3 weeks (around 6/27/2022).  To see how she is doing with continued formal physical therapy and after her corticosteroid injection performed by Dr. Pino on May 17, 2022        5/5/2022 7:20 AM     HISTORY/REASON FOR EXAM:  Shoulder trauma, instability. Dislocation 2 weeks ago     TECHNIQUE/EXAM DESCRIPTION:  MRI of the LEFT shoulder without " contrast.     The study was performed on a [employer] 1.5 Dayna MRI scanner.     T1 sagittal, fast spin-echo T2 fat-suppressed oblique coronal, sagittal, and axial and intermediate fast spin-echo oblique coronal images were obtained.     COMPARISON: 4/14/2022 radiographs     FINDINGS:  Glenohumeral joint space: There is  a physiologic amount of joint fluid.     Rotator cuff:  No tear, tendinopathy or atrophy.     Labrum:  On a single axial slice, 15, there is high signal crossing the posterior superior labrum. This is not confirmed in the other sequences. No adjacent cyst. There is no joint fluid to assist with the evaluation     The long head biceps is within normal limits.     Osseous structures/cartilage:  No acute fracture is seen. No focal cartilage defect or glenohumeral osteophyte formation. Normal acromioclavicular joint.     IMPRESSION:     No MR evidence of prior dislocation or tendinopathy     Slight increased posterior superior labral signal is more likely the normal fibrous and hyaline cartilage junction favored over small SLAP. If there is high clinical concern, shoulder MR arthrogram may help clarify             Exam Ended: 05/05/22  8:01 AM Last Resulted: 05/05/22  9:44 AM             Disability forms completed and will be scanned.    Greater than 45 minutes was spent reviewing patient history, discussing current issue, physical examination, reviewing results and documenting the visit.

## 2022-06-06 NOTE — LETTER
June 6, 2022         Patient: Virginia Guzman   YOB: 1980   Date of Visit: 6/6/2022           To Whom it May Concern:    Virginia Guzman was seen in my clinic on 6/6/2022. She still has limited use of the LEFT upper extremity, recommend avoiding greater than 15 pounds and avoiding any overhead activity until re-evaluation in 3-4 weeks.    If you have any questions or concerns, please don't hesitate to call.        Sincerely,           Arthur Scott M.D.  Electronically Signed

## 2022-06-10 ENCOUNTER — DOCUMENTATION (OUTPATIENT)
Dept: SPORTS MEDICINE | Facility: CLINIC | Age: 42
End: 2022-06-10
Payer: COMMERCIAL

## 2022-06-20 ENCOUNTER — TELEPHONE (OUTPATIENT)
Dept: URGENT CARE | Facility: CLINIC | Age: 42
End: 2022-06-20

## 2022-06-20 NOTE — TELEPHONE ENCOUNTER
The patient called in regards to the short term disability paperwork and needing to see if we can fill out her unemployment paperwork. Per the patient she will drop off the paperwork tomorrow.    Shirley

## 2022-06-24 ENCOUNTER — DOCUMENTATION (OUTPATIENT)
Dept: SPORTS MEDICINE | Facility: CLINIC | Age: 42
End: 2022-06-24
Payer: COMMERCIAL

## 2022-06-27 ENCOUNTER — OFFICE VISIT (OUTPATIENT)
Dept: SPORTS MEDICINE | Facility: CLINIC | Age: 42
End: 2022-06-27
Payer: COMMERCIAL

## 2022-06-27 VITALS
DIASTOLIC BLOOD PRESSURE: 78 MMHG | TEMPERATURE: 98.1 F | OXYGEN SATURATION: 96 % | WEIGHT: 214 LBS | RESPIRATION RATE: 16 BRPM | HEART RATE: 82 BPM | BODY MASS INDEX: 35.65 KG/M2 | SYSTOLIC BLOOD PRESSURE: 122 MMHG | HEIGHT: 65 IN

## 2022-06-27 DIAGNOSIS — M25.512 ACUTE PAIN OF LEFT SHOULDER: ICD-10-CM

## 2022-06-27 DIAGNOSIS — S43.432A SUPERIOR LABRUM ANTERIOR-TO-POSTERIOR (SLAP) TEAR OF LEFT SHOULDER: ICD-10-CM

## 2022-06-27 DIAGNOSIS — S43.005A: ICD-10-CM

## 2022-06-27 PROCEDURE — 99213 OFFICE O/P EST LOW 20 MIN: CPT | Performed by: FAMILY MEDICINE

## 2022-06-27 NOTE — PROGRESS NOTES
"Chief Complaint   Patient presents with   • Shoulder Pain     F/V L shoulder pain      Subjective     Virginia Guzman is complaining of left shoulder pain (L handed)  Date of injury, April 13, 2022  Mechanism of injury, shoulder popped out while walking her dog  Her  popped it back in    injection performed by Dr. 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 Superior labrum anterior-to-posterior (SLAP) tear of left shoulder     2. Closed traumatic dislocation of shoulder, left, initial encounter     3. Acute pain of left shoulder       Date of injury, April 13, 2022  Mechanism of injury, shoulder popped out while walking her dog  Her  popped it back in    She has responded well to physical therapy ([facility]) exercises    Biceps corticosteroid injection performed by Chase Pino MD (sports medicine orthopedic specialist on May 17, 2020) HELPED    Ibuprofen OTC as needed    She has been cleared to work without restriction  Recommend trying to avoid extreme shoulder motion while lifting  Continue home exercises    Follow-up as needed        5/5/2022 7:20 AM     HISTORY/REASON FOR EXAM:  Shoulder trauma, instability. Dislocation 2 weeks ago     TECHNIQUE/EXAM DESCRIPTION:  MRI of the LEFT shoulder without " contrast.     The study was performed on a [employer] 1.5 Dayna MRI scanner.     T1 sagittal, fast spin-echo T2 fat-suppressed oblique coronal, sagittal, and axial and intermediate fast spin-echo oblique coronal images were obtained.     COMPARISON: 4/14/2022 radiographs     FINDINGS:  Glenohumeral joint space: There is  a physiologic amount of joint fluid.     Rotator cuff:  No tear, tendinopathy or atrophy.     Labrum:  On a single axial slice, 15, there is high signal crossing the posterior superior labrum. This is not confirmed in the other sequences. No adjacent cyst. There is no joint fluid to assist with the evaluation     The long head biceps is within normal limits.     Osseous structures/cartilage:  No acute fracture is seen. No focal cartilage defect or glenohumeral osteophyte formation. Normal acromioclavicular joint.     IMPRESSION:     No MR evidence of prior dislocation or tendinopathy     Slight increased posterior superior labral signal is more likely the normal fibrous and hyaline cartilage junction favored over small SLAP. If there is high clinical concern, shoulder MR arthrogram may help clarify             Exam Ended: 05/05/22  8:01 AM Last Resulted: 05/05/22  9:44 AM

## 2022-06-27 NOTE — LETTER
June 27, 2022         Patient: Virginia Guzman   YOB: 1980   Date of Visit: 6/27/2022           To Whom it May Concern:    Virginia Guzman was seen in my clinic on 6/27/2022. She may return to work without restriction.    If you have any questions or concerns, please don't hesitate to call.        Sincerely,           Arthur Scott M.D.  Electronically Signed

## 2022-06-28 ENCOUNTER — NON-PROVIDER VISIT (OUTPATIENT)
Dept: URGENT CARE | Facility: PHYSICIAN GROUP | Age: 42
End: 2022-06-28

## 2022-06-28 DIAGNOSIS — Z02.1 PRE-EMPLOYMENT DRUG SCREENING: ICD-10-CM

## 2022-06-28 PROCEDURE — 80305 DRUG TEST PRSMV DIR OPT OBS: CPT | Performed by: PHYSICIAN ASSISTANT

## 2023-03-28 ENCOUNTER — HOSPITAL ENCOUNTER (OUTPATIENT)
Dept: LAB | Facility: MEDICAL CENTER | Age: 43
End: 2023-03-28
Payer: COMMERCIAL

## 2023-03-28 ENCOUNTER — OFFICE VISIT (OUTPATIENT)
Dept: MEDICAL GROUP | Facility: MEDICAL CENTER | Age: 43
End: 2023-03-28
Payer: COMMERCIAL

## 2023-03-28 VITALS
SYSTOLIC BLOOD PRESSURE: 118 MMHG | HEIGHT: 65 IN | DIASTOLIC BLOOD PRESSURE: 56 MMHG | OXYGEN SATURATION: 96 % | HEART RATE: 97 BPM | TEMPERATURE: 98.3 F | BODY MASS INDEX: 37.25 KG/M2 | WEIGHT: 223.6 LBS

## 2023-03-28 DIAGNOSIS — R13.10 DYSPHAGIA, UNSPECIFIED TYPE: ICD-10-CM

## 2023-03-28 DIAGNOSIS — Z11.59 NEED FOR HEPATITIS C SCREENING TEST: ICD-10-CM

## 2023-03-28 DIAGNOSIS — R10.32 LLQ PAIN: ICD-10-CM

## 2023-03-28 DIAGNOSIS — N39.46 MIXED STRESS AND URGE URINARY INCONTINENCE: ICD-10-CM

## 2023-03-28 DIAGNOSIS — E66.9 OBESITY (BMI 30-39.9): ICD-10-CM

## 2023-03-28 DIAGNOSIS — K58.2 IRRITABLE BOWEL SYNDROME WITH CONSTIPATION AND DIARRHEA: ICD-10-CM

## 2023-03-28 PROBLEM — N80.9 ENDOMETRIOSIS: Status: ACTIVE | Noted: 2023-03-28

## 2023-03-28 PROBLEM — R07.2 PRECORDIAL PAIN: Status: RESOLVED | Noted: 2021-01-06 | Resolved: 2023-03-28

## 2023-03-28 PROBLEM — R10.9 STOMACH PAIN: Status: ACTIVE | Noted: 2023-03-28

## 2023-03-28 LAB
ALBUMIN SERPL BCP-MCNC: 4.2 G/DL (ref 3.2–4.9)
ALBUMIN/GLOB SERPL: 1.8 G/DL
ALP SERPL-CCNC: 44 U/L (ref 30–99)
ALT SERPL-CCNC: 35 U/L (ref 2–50)
ANION GAP SERPL CALC-SCNC: 9 MMOL/L (ref 7–16)
AST SERPL-CCNC: 21 U/L (ref 12–45)
BILIRUB SERPL-MCNC: 0.3 MG/DL (ref 0.1–1.5)
BUN SERPL-MCNC: 9 MG/DL (ref 8–22)
CALCIUM ALBUM COR SERPL-MCNC: 8.5 MG/DL (ref 8.5–10.5)
CALCIUM SERPL-MCNC: 8.7 MG/DL (ref 8.5–10.5)
CHLORIDE SERPL-SCNC: 107 MMOL/L (ref 96–112)
CHOLEST SERPL-MCNC: 154 MG/DL (ref 100–199)
CO2 SERPL-SCNC: 27 MMOL/L (ref 20–33)
CREAT SERPL-MCNC: 0.7 MG/DL (ref 0.5–1.4)
ERYTHROCYTE [DISTWIDTH] IN BLOOD BY AUTOMATED COUNT: 41.5 FL (ref 35.9–50)
EST. AVERAGE GLUCOSE BLD GHB EST-MCNC: 85 MG/DL
FASTING STATUS PATIENT QL REPORTED: NORMAL
GFR SERPLBLD CREATININE-BSD FMLA CKD-EPI: 111 ML/MIN/1.73 M 2
GLOBULIN SER CALC-MCNC: 2.3 G/DL (ref 1.9–3.5)
GLUCOSE SERPL-MCNC: 78 MG/DL (ref 65–99)
HBA1C MFR BLD: 4.6 % (ref 4–5.6)
HCT VFR BLD AUTO: 43.7 % (ref 37–47)
HDLC SERPL-MCNC: 44 MG/DL
HGB BLD-MCNC: 15 G/DL (ref 12–16)
LDLC SERPL CALC-MCNC: 97 MG/DL
MCH RBC QN AUTO: 31.2 PG (ref 27–33)
MCHC RBC AUTO-ENTMCNC: 34.3 G/DL (ref 33.6–35)
MCV RBC AUTO: 90.9 FL (ref 81.4–97.8)
PLATELET # BLD AUTO: 328 K/UL (ref 164–446)
PMV BLD AUTO: 9.9 FL (ref 9–12.9)
POTASSIUM SERPL-SCNC: 4 MMOL/L (ref 3.6–5.5)
PROT SERPL-MCNC: 6.5 G/DL (ref 6–8.2)
RBC # BLD AUTO: 4.81 M/UL (ref 4.2–5.4)
SODIUM SERPL-SCNC: 143 MMOL/L (ref 135–145)
TRIGL SERPL-MCNC: 65 MG/DL (ref 0–149)
WBC # BLD AUTO: 6.4 K/UL (ref 4.8–10.8)

## 2023-03-28 PROCEDURE — 80053 COMPREHEN METABOLIC PANEL: CPT

## 2023-03-28 PROCEDURE — 99204 OFFICE O/P NEW MOD 45 MIN: CPT

## 2023-03-28 PROCEDURE — 86803 HEPATITIS C AB TEST: CPT

## 2023-03-28 PROCEDURE — 36415 COLL VENOUS BLD VENIPUNCTURE: CPT

## 2023-03-28 PROCEDURE — 83036 HEMOGLOBIN GLYCOSYLATED A1C: CPT

## 2023-03-28 PROCEDURE — 85027 COMPLETE CBC AUTOMATED: CPT

## 2023-03-28 PROCEDURE — 84443 ASSAY THYROID STIM HORMONE: CPT

## 2023-03-28 PROCEDURE — 80061 LIPID PANEL: CPT

## 2023-03-28 ASSESSMENT — ENCOUNTER SYMPTOMS
SHORTNESS OF BREATH: 0
COUGH: 0
PALPITATIONS: 0
CHILLS: 0
ORTHOPNEA: 0
FEVER: 0

## 2023-03-28 ASSESSMENT — PATIENT HEALTH QUESTIONNAIRE - PHQ9: CLINICAL INTERPRETATION OF PHQ2 SCORE: 0

## 2023-03-28 NOTE — LETTER
March 28, 2023    To Whom It May Concern:         This is confirmation that Virginia Levy attended her scheduled appointment with DEANNE Zambrano on 3/28/23. Please excuse her missed work today 3/28/23 and tomorrow 3/29/23.         If you have any questions please do not hesitate to call me at the phone number listed below.    Sincerely,          FELIPE Zambrano.  [phone]

## 2023-03-29 LAB
HCV AB SER QL: NORMAL
TSH SERPL DL<=0.005 MIU/L-ACNC: 3.33 UIU/ML (ref 0.38–5.33)

## 2023-04-10 ENCOUNTER — HOSPITAL ENCOUNTER (OUTPATIENT)
Dept: RADIOLOGY | Facility: MEDICAL CENTER | Age: 43
End: 2023-04-10
Payer: COMMERCIAL

## 2023-04-10 DIAGNOSIS — R10.32 LLQ PAIN: ICD-10-CM

## 2023-04-10 DIAGNOSIS — K58.2 IRRITABLE BOWEL SYNDROME WITH CONSTIPATION AND DIARRHEA: ICD-10-CM

## 2023-04-10 PROCEDURE — 700117 HCHG RX CONTRAST REV CODE 255

## 2023-04-10 PROCEDURE — 74177 CT ABD & PELVIS W/CONTRAST: CPT

## 2023-04-10 RX ADMIN — IOHEXOL 100 ML: 350 INJECTION, SOLUTION INTRAVENOUS at 10:38

## 2023-04-10 RX ADMIN — IOHEXOL 25 ML: 240 INJECTION, SOLUTION INTRATHECAL; INTRAVASCULAR; INTRAVENOUS; ORAL at 10:38

## 2023-05-31 ENCOUNTER — APPOINTMENT (OUTPATIENT)
Dept: RADIOLOGY | Facility: MEDICAL CENTER | Age: 43
End: 2023-05-31
Attending: EMERGENCY MEDICINE
Payer: COMMERCIAL

## 2023-05-31 ENCOUNTER — HOSPITAL ENCOUNTER (EMERGENCY)
Facility: MEDICAL CENTER | Age: 43
End: 2023-05-31
Attending: EMERGENCY MEDICINE
Payer: COMMERCIAL

## 2023-05-31 VITALS
BODY MASS INDEX: 35.43 KG/M2 | DIASTOLIC BLOOD PRESSURE: 75 MMHG | TEMPERATURE: 98 F | SYSTOLIC BLOOD PRESSURE: 103 MMHG | HEIGHT: 66 IN | RESPIRATION RATE: 16 BRPM | HEART RATE: 77 BPM | OXYGEN SATURATION: 96 % | WEIGHT: 220.46 LBS

## 2023-05-31 DIAGNOSIS — R10.31 RLQ ABDOMINAL PAIN: ICD-10-CM

## 2023-05-31 LAB
ALBUMIN SERPL BCP-MCNC: 4.1 G/DL (ref 3.2–4.9)
ALBUMIN/GLOB SERPL: 1.4 G/DL
ALP SERPL-CCNC: 43 U/L (ref 30–99)
ALT SERPL-CCNC: 36 U/L (ref 2–50)
ANION GAP SERPL CALC-SCNC: 13 MMOL/L (ref 7–16)
APPEARANCE UR: CLEAR
AST SERPL-CCNC: 21 U/L (ref 12–45)
BASOPHILS # BLD AUTO: 0.5 % (ref 0–1.8)
BASOPHILS # BLD: 0.04 K/UL (ref 0–0.12)
BILIRUB SERPL-MCNC: 0.4 MG/DL (ref 0.1–1.5)
BILIRUB UR QL STRIP.AUTO: NEGATIVE
BUN SERPL-MCNC: 10 MG/DL (ref 8–22)
CALCIUM ALBUM COR SERPL-MCNC: 9.2 MG/DL (ref 8.5–10.5)
CALCIUM SERPL-MCNC: 9.3 MG/DL (ref 8.5–10.5)
CHLORIDE SERPL-SCNC: 104 MMOL/L (ref 96–112)
CO2 SERPL-SCNC: 24 MMOL/L (ref 20–33)
COLOR UR: YELLOW
CREAT SERPL-MCNC: 0.72 MG/DL (ref 0.5–1.4)
EOSINOPHIL # BLD AUTO: 0.17 K/UL (ref 0–0.51)
EOSINOPHIL NFR BLD: 2.1 % (ref 0–6.9)
ERYTHROCYTE [DISTWIDTH] IN BLOOD BY AUTOMATED COUNT: 41.9 FL (ref 35.9–50)
GFR SERPLBLD CREATININE-BSD FMLA CKD-EPI: 107 ML/MIN/1.73 M 2
GLOBULIN SER CALC-MCNC: 2.9 G/DL (ref 1.9–3.5)
GLUCOSE SERPL-MCNC: 81 MG/DL (ref 65–99)
GLUCOSE UR STRIP.AUTO-MCNC: NEGATIVE MG/DL
HCG SERPL QL: NEGATIVE
HCT VFR BLD AUTO: 45.8 % (ref 37–47)
HGB BLD-MCNC: 15.8 G/DL (ref 12–16)
IMM GRANULOCYTES # BLD AUTO: 0.02 K/UL (ref 0–0.11)
IMM GRANULOCYTES NFR BLD AUTO: 0.2 % (ref 0–0.9)
KETONES UR STRIP.AUTO-MCNC: NEGATIVE MG/DL
LEUKOCYTE ESTERASE UR QL STRIP.AUTO: NEGATIVE
LIPASE SERPL-CCNC: 27 U/L (ref 11–82)
LYMPHOCYTES # BLD AUTO: 2.34 K/UL (ref 1–4.8)
LYMPHOCYTES NFR BLD: 28.7 % (ref 22–41)
MCH RBC QN AUTO: 30.9 PG (ref 27–33)
MCHC RBC AUTO-ENTMCNC: 34.5 G/DL (ref 32.2–35.5)
MCV RBC AUTO: 89.5 FL (ref 81.4–97.8)
MICRO URNS: NORMAL
MONOCYTES # BLD AUTO: 0.49 K/UL (ref 0–0.85)
MONOCYTES NFR BLD AUTO: 6 % (ref 0–13.4)
NEUTROPHILS # BLD AUTO: 5.09 K/UL (ref 1.82–7.42)
NEUTROPHILS NFR BLD: 62.5 % (ref 44–72)
NITRITE UR QL STRIP.AUTO: NEGATIVE
NRBC # BLD AUTO: 0 K/UL
NRBC BLD-RTO: 0 /100 WBC (ref 0–0.2)
PH UR STRIP.AUTO: 5.5 [PH] (ref 5–8)
PLATELET # BLD AUTO: 333 K/UL (ref 164–446)
PMV BLD AUTO: 9.6 FL (ref 9–12.9)
POTASSIUM SERPL-SCNC: 3.6 MMOL/L (ref 3.6–5.5)
PROT SERPL-MCNC: 7 G/DL (ref 6–8.2)
PROT UR QL STRIP: NEGATIVE MG/DL
RBC # BLD AUTO: 5.12 M/UL (ref 4.2–5.4)
RBC UR QL AUTO: NEGATIVE
SODIUM SERPL-SCNC: 141 MMOL/L (ref 135–145)
SP GR UR STRIP.AUTO: 1.02
UROBILINOGEN UR STRIP.AUTO-MCNC: 1 MG/DL
WBC # BLD AUTO: 8.2 K/UL (ref 4.8–10.8)

## 2023-05-31 PROCEDURE — 74177 CT ABD & PELVIS W/CONTRAST: CPT

## 2023-05-31 PROCEDURE — 84703 CHORIONIC GONADOTROPIN ASSAY: CPT

## 2023-05-31 PROCEDURE — 700117 HCHG RX CONTRAST REV CODE 255: Performed by: EMERGENCY MEDICINE

## 2023-05-31 PROCEDURE — 85025 COMPLETE CBC W/AUTO DIFF WBC: CPT

## 2023-05-31 PROCEDURE — 99284 EMERGENCY DEPT VISIT MOD MDM: CPT

## 2023-05-31 PROCEDURE — 81003 URINALYSIS AUTO W/O SCOPE: CPT

## 2023-05-31 PROCEDURE — 83690 ASSAY OF LIPASE: CPT

## 2023-05-31 PROCEDURE — 36415 COLL VENOUS BLD VENIPUNCTURE: CPT

## 2023-05-31 PROCEDURE — 80053 COMPREHEN METABOLIC PANEL: CPT

## 2023-05-31 RX ADMIN — IOHEXOL 100 ML: 350 INJECTION, SOLUTION INTRAVENOUS at 16:15

## 2023-05-31 ASSESSMENT — FIBROSIS 4 INDEX: FIB4 SCORE: 0.45

## 2023-05-31 NOTE — ED NOTES
Pt ambulated to the room with steady gait and without assistance. Changed into a gown and placed on monitor. Call light within reach. No further complaints at this time. Chart up for ERP.    Urine sample collected and sent to lab by [employer] tech. Blood collected by phlebotomy prior to room assignment.

## 2023-05-31 NOTE — ED TRIAGE NOTES
"Chief Complaint   Patient presents with    RLQ Pain     Intermittent \"pressure\" in RLQ x2 days.     N/V     BP (!) 145/95   Pulse 80   Temp 36.6 °C (97.9 °F) (Temporal)   Resp 18   Ht 1.676 m (5' 6\")   Wt 100 kg (220 lb 7.4 oz)   SpO2 98%   BMI 35.58 kg/m²     Pt ambulated into triage. Educated on triage process. Instructed to notify staff for any worsening symptoms. Urine sample sent to lab.  "

## 2023-05-31 NOTE — ED NOTES
For the past two days pt has been having RLQ pain 2/10, pt states she feels constant pressure.  Pain travels to her middle of her abdomen.

## 2023-05-31 NOTE — DISCHARGE INSTRUCTIONS
It is not clear that constipation is causing her symptoms but could be contributing.  Therefore I recommend:  Magnesium citrate from above.  Dulcolax suppository followed by Fleet enema from below.  As we discussed, the cause of your symptoms is not entirely clear.  For this reason, it is important to return here for increasing pain, fever or any turn for the worse.

## 2023-05-31 NOTE — ED PROVIDER NOTES
"ED Provider Note    CHIEF COMPLAINT  Chief Complaint   Patient presents with    RLQ Pain     Intermittent \"pressure\" in RLQ x2 days.     N/V       EXTERNAL RECORDS REVIEWED  Outpatient labs & studies CT scan from about 6 weeks ago shows moderate colonic stool    HPI/ROS  LIMITATION TO HISTORY   Select: : None      Virginia Levy is a 43 y.o. female who presents for abdominal pain.  It started off as a pressure, as if she was pushed with the finger in her right lower quadrant, that is gotten more severe and steady.  It hurts to touch.  Nothing makes it better.  She has abnormal stooling at baseline, alternate between diarrhea and constipation.  This is unchanged.  She has been stooling.  She denies any vaginal symptoms or vaginal bleeding.  Does not think that she is pregnant.  Denies any urine changes at all.  There is no flank pain.  She had a fever to 100.3 degrees last night.  No nausea or vomiting.  There is no other complaint.    PAST MEDICAL HISTORY   None    SURGICAL HISTORY   has a past surgical history that includes primary c section and tubal ligation (Bilateral).    FAMILY HISTORY  Family History   Problem Relation Age of Onset    Heart Attack Mother     Pulmonary Embolism Mother     Colorectal Cancer Father     Cancer Father     Esophageal Cancer Father     Prostate cancer Father     Lymphoma Father     Lung Cancer Father     GI Disease Brother         crohns    Diabetes Brother     Obesity Brother     Hypotension Brother     No Known Problems Daughter     No Known Problems Son        SOCIAL HISTORY  Social History     Tobacco Use    Smoking status: Every Day     Packs/day: 0.25     Types: Cigarettes     Start date: 12/16/1996    Smokeless tobacco: Never   Vaping Use    Vaping Use: Never used   Substance and Sexual Activity    Alcohol use: Not Currently    Drug use: Yes     Types: Marijuana     Comment: occ    Sexual activity: Not on file       CURRENT MEDICATIONS  Home Medications       Reviewed " "by Ciara Zamora R.N. (Registered Nurse) on 05/31/23 at 1334  Med List Status: Not Addressed     Medication Last Dose Status        Patient Simba Taking any Medications                           ALLERGIES  Allergies   Allergen Reactions    Bee        PHYSICAL EXAM  VITAL SIGNS: BP (!) 154/103   Pulse 89   Temp 36.6 °C (97.9 °F) (Temporal)   Resp 14   Ht 1.676 m (5' 6\")   Wt 100 kg (220 lb 7.4 oz)   SpO2 98%   BMI 35.58 kg/m²    Constitutional: Well appearing patient in no acute distress.  HENT: Head is without trauma.  Oropharynx is clear.  Mucous membranes are moist.  Eyes: Sclerae are nonicteric, pupils are equally round.  Neck: Supple with grossly normal range of motion.  Cardiovascular: Heart is regular rate and rhythm without murmur rub or gallop.  Peripheral pulses are intact and symmetric throughout.  Thorax & Lungs: Breathing easily.  Good air movement.  There is no wheeze, rhonchi or rales.  Abdomen: Bowel sounds normal, soft, non-distended, nontender, no mass nor pulsatile mass. I do not appreciate hepatosplenomegaly.  Skin: No apparent rash.  I do not see petechiae or purpura.  Extremities: No evidence of acute trauma.  No clubbing, cyanosis, edema, no Homans or cords.  Neurologic: Alert. Moving all extremities.  Intact sensation and strength throughout.  Gait is normal.  Psychiatric: Normal for situation      DIAGNOSTIC STUDIES / PROCEDURES      LABS  Labs Reviewed   CBC WITH DIFFERENTIAL   COMP METABOLIC PANEL   LIPASE   HCG QUAL SERUM   URINALYSIS,CULTURE IF INDICATED    Narrative:     Indication for culture:->Patient WITHOUT an indwelling Paez  catheter in place with new onset of Dysuria, Frequency,  Urgency, and/or Suprapubic pain   CORRECTED CALCIUM   ESTIMATED GFR         RADIOLOGY  I have independently interpreted the diagnostic imaging associated with this visit and am waiting the final reading from the radiologist.   My preliminary interpretation is as follows: No appendicitis or " other apparent inflammatory changes  Radiologist interpretation:   CT-ABDOMEN-PELVIS WITH   Final Result      1.  No acute abnormality of the abdomen or pelvis. Normal-appearing appendix.   2.  Hepatomegaly.   3.  Hepatic steatosis.            COURSE & MEDICAL DECISION MAKING    ED Observation Status? Yes; I am placing the patient in to an observation status due to a diagnostic uncertainty as well as therapeutic intensity. Patient placed in observation status at 2:43 PM, 5/31/2023.     Observation plan is as follows: We will check a CT scan.  Her disposition will be based upon this.    Upon Reevaluation, the patient's condition has: Stable; and will be discharged.    Patient discharged from ED Observation status at 1620 (Time) 5/31/2023  (Date).     INITIAL ASSESSMENT, COURSE AND PLAN  Care Narrative: This patient presents with abdominal pain.  It is focal, right lower quadrant, she is tender to touch.  Although she does not have rebound or guarding, does not leukocytosis, I am concerned the fact that she has tenderness and I do not feel comfortable ascribing t this to constipation.  We talked about doing a KUB and a bowel regimen versus proceeding with CT scan.  With shared decision-making, she is opted for the latter approach.    Urine shows no evidence of infection.  No blood to suggest stone.  There is no leukocytosis.  There is no shift.  Electrolytes are normal.  There is no renal or hepatic insufficiency.  No pancreatitis.    I have gone through the patient's CT with her at the bedside.  There is no evidence of obstruction, acute inflammatory changes appendicitis.  No kidney stones.  No gallstones.  No obvious adnexal masses.  Upon recheck at discharge, her abdomen remains benign without evidence of a surgical process.    At this point, I have discussed with her I do not have an obvious cause for her symptoms.  Early appendicitis would be a possibility however, with 2 days of symptoms, normal white blood  count and a appendix visualized to be normal, this would be unlikely.  I do wonder if it could be an element of constipation.  I recommend over-the-counter therapies to include MiraLAX, Dulcolax and Fleet enema.  She is given instructions on abdominal pain of uncertain etiology, as well as constipation.  She was cautioned that I do not have a clear etiology of her symptoms, and therefore should she have increasing pain, fever, or any turn for the worse she is to return here to be seen.  DISPOSITION AND DISCUSSIONS      FINAL DIAGNOSIS  1. RLQ abdominal pain           Electronically signed by: Donald Parker M.D., 5/31/2023 2:42 PM

## 2023-06-01 ENCOUNTER — PATIENT MESSAGE (OUTPATIENT)
Dept: MEDICAL GROUP | Facility: MEDICAL CENTER | Age: 43
End: 2023-06-01
Payer: COMMERCIAL

## 2023-06-01 NOTE — LETTER
June 1, 2023    To Whom It May Concern:         This is confirmation that Virginia Levy was treated in the emergency room on 5/31/23. Please excuse her from work from 5/29/23-6/4/23.         If you have any questions please do not hesitate to call me at the phone number listed below.    Sincerely,          Sindy Gee, A.P.R.N.  [phone]  (Signed electronically)

## 2023-06-01 NOTE — PROGRESS NOTES
Saran Coleman,     I have sent you a work note to have off through 6/4/23. If you cannot find it in [employer] let me know and we can copy it into a message for you.     Thank you,  SUZANNE Corral

## 2024-11-18 ENCOUNTER — OFFICE VISIT (OUTPATIENT)
Dept: OBGYN | Facility: CLINIC | Age: 44
End: 2024-11-18
Payer: COMMERCIAL

## 2024-11-18 ENCOUNTER — HOSPITAL ENCOUNTER (OUTPATIENT)
Facility: MEDICAL CENTER | Age: 44
End: 2024-11-18
Payer: COMMERCIAL

## 2024-11-18 VITALS
BODY MASS INDEX: 36.32 KG/M2 | DIASTOLIC BLOOD PRESSURE: 79 MMHG | WEIGHT: 226 LBS | SYSTOLIC BLOOD PRESSURE: 101 MMHG | HEIGHT: 66 IN

## 2024-11-18 DIAGNOSIS — Z01.419 WELL WOMAN EXAM: ICD-10-CM

## 2024-11-18 DIAGNOSIS — Z12.39 ENCOUNTER FOR BREAST CANCER SCREENING USING NON-MAMMOGRAM MODALITY: ICD-10-CM

## 2024-11-18 DIAGNOSIS — Z12.4 SCREENING FOR CERVICAL CANCER: ICD-10-CM

## 2024-11-18 PROCEDURE — 88142 CYTOPATH C/V THIN LAYER: CPT

## 2024-11-18 PROCEDURE — 3078F DIAST BP <80 MM HG: CPT

## 2024-11-18 PROCEDURE — 99386 PREV VISIT NEW AGE 40-64: CPT

## 2024-11-18 PROCEDURE — 3074F SYST BP LT 130 MM HG: CPT

## 2024-11-18 RX ORDER — ACETAMINOPHEN 500 MG
500-1000 TABLET ORAL EVERY 6 HOURS PRN
COMMUNITY

## 2024-11-18 RX ORDER — IBUPROFEN 800 MG/1
800 TABLET, FILM COATED ORAL EVERY 8 HOURS PRN
COMMUNITY

## 2024-11-18 ASSESSMENT — FIBROSIS 4 INDEX: FIB4 SCORE: 0.45

## 2024-11-19 DIAGNOSIS — Z01.419 WELL WOMAN EXAM: ICD-10-CM

## 2024-11-19 DIAGNOSIS — Z12.4 SCREENING FOR CERVICAL CANCER: ICD-10-CM

## 2024-11-19 NOTE — PROGRESS NOTES
Patient here for annual well woman exam  Patient reports abnormal uterine bleeding the last 2 months.  Patient also has experienced breast tenderness prior to period for the past couple months  Last Period: 10/17/2024  Last Pap/Results: 2008  Last Mammogram: Never - Pt would prefer breast ultrasound if possible  Sexually Active: Yes - one male partner  Birth Control Method: Patient had tubal ligation in 2009  Phone: [phone]  Pharmacy: [facility] [place]

## 2024-11-19 NOTE — PROGRESS NOTES
ANNUAL GYNECOLOGY VISIT    Chief Complaint  Annual Exam    Subjective  Virginia Levy is a 44 y.o. female  Patient's last menstrual period was 10/17/2024 (approximate). on BTL for contraception / menopausal  who presents today for Annual Exam. Pt complains of irregular menstrual cycle. It has been regular until Sept. She states that she bled for 9 days and then a week of spotting. BTL in  with . Has breast tenderness the week before her menses. Was diagnoses with endometriosis at 17 yrs old. Menses before sept would last around 5 days. She report having night sweats and hot flashes during the day. Patient is experiencing pain during intercourse when her . She is also interested in a hysterectomy due to family history of ovarian and uterine cancer.     Preventive Care   Immunization History   Administered Date(s) Administered    Hepatitis B Immune Globulin - HISTORICAL DATA 1993, 1998     Last Pap:   Any abnormal pap smears?  no  Last Mammogram: never had   Last Colonoscopy: had one last summer with digestive health   Last DEXA: n/a      Gynecology History  Current Sexual Activity: yes -    History of sexually transmitted diseases? no  Abnormal discharge? no  Menopause: no  Postmenopausal bleeding: no    Menstrual History  Patient's last menstrual period was 10/17/2024 (approximate).  Periods are irregular, lasting 9 days.   Dysmenorrhea :none. Cyclic symptoms include headache. No intermenstrual bleeding, spotting, or discharge.  PMS symptoms?  no    Contraception  Current: tubal ligation  Past: was on depo and stopped at 22 and then didn't take birth control       Cancer Risk Assessement:  Family history of:   - Breast cancer: maternal grandmother    - Ovarian cancer: [place] grandmother    - Uterine cancer: mother at 42   - Colon cancer: no    Obstetric History  OB History    Para Term  AB Living   8       6 2   SAB IAB Ectopic Molar Multiple  Live Births   5 1              # Outcome Date GA Lbr Jaison/2nd Weight Sex Type Anes PTL Lv   8             7             6 SAB            5 SAB            4 SAB            3 SAB            2 SAB            1 IAB                Past Medical History  History reviewed. No pertinent past medical history.    Past Surgical History  Past Surgical History:   Procedure Laterality Date    PRIMARY C SECTION  2009    TUBAL LIGATION Bilateral 2009       Social History  Social History     Tobacco Use    Smoking status: Every Day     Current packs/day: 0.25     Average packs/day: 0.3 packs/day for 27.9 years (7.0 ttl pk-yrs)     Types: Cigarettes     Start date: 1996    Smokeless tobacco: Never   Vaping Use    Vaping status: Never Used   Substance Use Topics    Alcohol use: Not Currently    Drug use: Yes     Types: Marijuana     Comment: occ        Family History  Family History   Problem Relation Age of Onset    Heart Attack Mother     Pulmonary Embolism Mother     Colorectal Cancer Father     Cancer Father     Esophageal Cancer Father     Prostate cancer Father     Lymphoma Father     Lung Cancer Father     GI Disease Brother         crohns    Diabetes Brother     Obesity Brother     Hypotension Brother     No Known Problems Daughter     No Known Problems Son        Home Medications  Current Outpatient Medications   Medication Sig    acetaminophen (TYLENOL) 500 MG Tab Take 500-1,000 mg by mouth every 6 hours as needed.    ibuprofen (MOTRIN) 800 MG Tab Take 800 mg by mouth every 8 hours as needed.       Allergies/Reactions  Allergies   Allergen Reactions    Bee        ROS  Review of Systems:  Gen: no fevers or chills, no significant weight loss or gain  Respiratory:  no cough or dyspnea  Cardiac:  no chest pain, no palpitations, no syncope  GI:  no heartburn, no abdominal pain, no nausea or vomiting  Psych: no depression or anxiety    Objective  /79 (BP Location: Right arm, Patient Position:  "Sitting, BP Cuff Size: Large adult)   Ht 5' 5.75\"   Wt 226 lb   LMP 10/17/2024 (Approximate)   BMI 36.76 kg/m²     Constitutional: The patient is well developed and well nourished.  Psychiatric: Patient is oriented to time place and person.   Skin: No rash observed.  Neck: Appears symmetric. Thyroid normal size  Respiratory: normal effort  Breast: Inspection reveals no asymetry or nipple discharge, no skin thickening, dimpling or erythema.  Palpation demonstrates no masses.  Abdomen: Soft, non-tender.  Pelvic Exam:      Vulva: external female genitalia are normal in appearance. No lesions     Urethra - no lesions, no erythema     Vagina: moist, pink, normal ruggae     Cervix: pink, smooth, no lesions, no CMT     Uterus - non-tender, normal size, shape, contour, mobile, anteverted     Ovaries: non-tender, no appreciable masses   Pap Smear performed: Yes   Chaperone Present: Myrtle Rhoades MA   Extremities: Legs are symmetric and without tenderness. There is no edema present.      Assessment & Plan  Virginia Levy is a 44 y.o. female who presents today for Annual Gyn Exam.     1. Health Maintenance   PAP: 2008  STI Screen (HIV, Syphilis, Chlamydia / Gonorrhea): refused  MAMMOGRAM: ordered today   COLONOSCOPY: pt states she had one last summer with digestive health   DEXA: n/a   IMMUNIZATIONS: all desired UTD  TOBACCO: cigarrets 1/2 3/4 pack   DIABETES SCREEN: ordered   CHOLESTEROL SCREEN: ordered  COUNSELING: breast self exam, mammography screening, use and side effects of HRT, menopause, osteoporosis, and adequate intake of calcium and vitamin D    2. Well woman exam    - US-SCREENING WHOLE BREAST (3D SCREENING); Future  - THINPREP PAP WITH HPV; collected  - TSH WITH REFLEX TO FT4; Future  - CBC WITH DIFFERENTIAL; Future  - BASIC METABOLIC PANEL  - HEMOGLOBIN A1C; Future  - VITAMIN D,25 HYDROXY (DEFICIENCY); Future  - Lipid Profile; Future    3. Screening for cervical cancer    - THINPREP PAP WITH HPV; " collected     4. Encounter for breast cancer screening using non-mammogram modality    - US-SCREENING WHOLE BREAST (3D SCREENING); Future      Return: Annually or PRN    DEANNE Fish  [facility]   11/18/2024

## 2024-11-21 ENCOUNTER — APPOINTMENT (OUTPATIENT)
Dept: LAB | Facility: MEDICAL CENTER | Age: 44
End: 2024-11-21
Payer: COMMERCIAL

## 2024-11-22 ENCOUNTER — HOSPITAL ENCOUNTER (OUTPATIENT)
Dept: LAB | Facility: MEDICAL CENTER | Age: 44
End: 2024-11-22
Payer: COMMERCIAL

## 2024-11-22 DIAGNOSIS — Z01.419 WELL WOMAN EXAM: ICD-10-CM

## 2024-11-22 LAB
25(OH)D3 SERPL-MCNC: 12 NG/ML (ref 30–100)
ANION GAP SERPL CALC-SCNC: 10 MMOL/L (ref 7–16)
BASOPHILS # BLD AUTO: 0.6 % (ref 0–1.8)
BASOPHILS # BLD: 0.04 K/UL (ref 0–0.12)
BUN SERPL-MCNC: 9 MG/DL (ref 8–22)
CALCIUM SERPL-MCNC: 8.8 MG/DL (ref 8.5–10.5)
CHLORIDE SERPL-SCNC: 106 MMOL/L (ref 96–112)
CHOLEST SERPL-MCNC: 148 MG/DL (ref 100–199)
CO2 SERPL-SCNC: 25 MMOL/L (ref 20–33)
CREAT SERPL-MCNC: 0.5 MG/DL (ref 0.5–1.4)
EOSINOPHIL # BLD AUTO: 0.16 K/UL (ref 0–0.51)
EOSINOPHIL NFR BLD: 2.3 % (ref 0–6.9)
ERYTHROCYTE [DISTWIDTH] IN BLOOD BY AUTOMATED COUNT: 41 FL (ref 35.9–50)
EST. AVERAGE GLUCOSE BLD GHB EST-MCNC: 82 MG/DL
FASTING STATUS PATIENT QL REPORTED: NORMAL
GFR SERPLBLD CREATININE-BSD FMLA CKD-EPI: 119 ML/MIN/1.73 M 2
GLUCOSE SERPL-MCNC: 91 MG/DL (ref 65–99)
HBA1C MFR BLD: 4.5 % (ref 4–5.6)
HCT VFR BLD AUTO: 44.6 % (ref 37–47)
HDLC SERPL-MCNC: 45 MG/DL
HGB BLD-MCNC: 15.4 G/DL (ref 12–16)
IMM GRANULOCYTES # BLD AUTO: 0.02 K/UL (ref 0–0.11)
IMM GRANULOCYTES NFR BLD AUTO: 0.3 % (ref 0–0.9)
LDLC SERPL CALC-MCNC: 92 MG/DL
LYMPHOCYTES # BLD AUTO: 1.44 K/UL (ref 1–4.8)
LYMPHOCYTES NFR BLD: 20.4 % (ref 22–41)
MCH RBC QN AUTO: 31.4 PG (ref 27–33)
MCHC RBC AUTO-ENTMCNC: 34.5 G/DL (ref 32.2–35.5)
MCV RBC AUTO: 90.8 FL (ref 81.4–97.8)
MONOCYTES # BLD AUTO: 0.45 K/UL (ref 0–0.85)
MONOCYTES NFR BLD AUTO: 6.4 % (ref 0–13.4)
NEUTROPHILS # BLD AUTO: 4.95 K/UL (ref 1.82–7.42)
NEUTROPHILS NFR BLD: 70 % (ref 44–72)
NRBC # BLD AUTO: 0 K/UL
NRBC BLD-RTO: 0 /100 WBC (ref 0–0.2)
PLATELET # BLD AUTO: 336 K/UL (ref 164–446)
PMV BLD AUTO: 10.1 FL (ref 9–12.9)
POTASSIUM SERPL-SCNC: 4.3 MMOL/L (ref 3.6–5.5)
RBC # BLD AUTO: 4.91 M/UL (ref 4.2–5.4)
SODIUM SERPL-SCNC: 141 MMOL/L (ref 135–145)
TRIGL SERPL-MCNC: 57 MG/DL (ref 0–149)
TSH SERPL DL<=0.005 MIU/L-ACNC: 2.29 UIU/ML (ref 0.38–5.33)
WBC # BLD AUTO: 7.1 K/UL (ref 4.8–10.8)

## 2024-11-22 PROCEDURE — 82306 VITAMIN D 25 HYDROXY: CPT

## 2024-11-22 PROCEDURE — 85025 COMPLETE CBC W/AUTO DIFF WBC: CPT

## 2024-11-22 PROCEDURE — 80061 LIPID PANEL: CPT

## 2024-11-22 PROCEDURE — 83036 HEMOGLOBIN GLYCOSYLATED A1C: CPT

## 2024-11-22 PROCEDURE — 80048 BASIC METABOLIC PNL TOTAL CA: CPT

## 2024-11-22 PROCEDURE — 36415 COLL VENOUS BLD VENIPUNCTURE: CPT

## 2024-11-22 PROCEDURE — 84443 ASSAY THYROID STIM HORMONE: CPT

## 2024-11-30 LAB
HPV I/H RISK 1 DNA SPEC QL NAA+PROBE: NOT DETECTED
SPECIMEN SOURCE: NORMAL
THINPREP PAP, CYTOLOGY NL11781: NORMAL

## 2025-01-16 ENCOUNTER — HOSPITAL ENCOUNTER (OUTPATIENT)
Dept: RADIOLOGY | Facility: MEDICAL CENTER | Age: 45
End: 2025-01-16
Payer: COMMERCIAL

## 2025-01-16 DIAGNOSIS — Z01.419 WELL WOMAN EXAM: ICD-10-CM

## 2025-01-16 DIAGNOSIS — Z12.39 ENCOUNTER FOR BREAST CANCER SCREENING USING NON-MAMMOGRAM MODALITY: ICD-10-CM

## 2025-01-16 PROCEDURE — 76641 ULTRASOUND BREAST COMPLETE: CPT

## 2025-01-20 ENCOUNTER — TELEPHONE (OUTPATIENT)
Dept: OBGYN | Facility: CLINIC | Age: 45
End: 2025-01-20
Payer: COMMERCIAL

## 2025-01-20 NOTE — TELEPHONE ENCOUNTER
Caller Name: Virginia Levy  Call Back Number: [phone]    How would the patient prefer to be contacted with a response: Phone call OK to leave a detailed message    Based on our review, you need to be seen as soon as possible. Call the clinic at [phone] as soon as you can. Please provide the information below.     Pt called stating she is very concerned and would like to contact Amy bermeo. She is very concerned after received a call from the breast center down stairs this morning stating she will need to get in sooner - she states she had an US of breast done on 1/16, and got an appt for 2/5 for a Mammogram, however when they called her they said they will need to see her STAT for the MA, so she is calling to see what is wrong or concerning about her imaging reports - I don't see that it is results therefore I will reach out to my senior MA to see who I can send this too so they can consult with patient.

## 2025-01-20 NOTE — TELEPHONE ENCOUNTER
Called patient to return her call from earlier regarding her mammo and breast US. I did let her know that Amy is not in office but I did have another PA review her US.   I explained to her that it just says she has dense breast tissue and because of that they are requesting a diagnostic mammogram to be done so they can be sure. As far as them requesting a STAT mammogram, Sendy and Sanjay do not see the need for one based off dense breast tissue and she has a diagnostic mammo scheduled for the 5th. She asked if dense breast tissue was bad and I did reassure her that it is very common especially when women have not gone through menopause. She understood and thanked me for explaining everything to her. Call was disconnected.

## 2025-01-27 DIAGNOSIS — R92.8 ABNORMAL MAMMOGRAM: ICD-10-CM

## 2025-02-05 ENCOUNTER — HOSPITAL ENCOUNTER (OUTPATIENT)
Dept: RADIOLOGY | Facility: MEDICAL CENTER | Age: 45
End: 2025-02-05
Payer: COMMERCIAL

## 2025-02-05 DIAGNOSIS — R92.8 ABNORMAL MAMMOGRAM: ICD-10-CM

## 2025-02-05 PROCEDURE — G0279 TOMOSYNTHESIS, MAMMO: HCPCS

## 2025-02-05 PROCEDURE — 76642 ULTRASOUND BREAST LIMITED: CPT | Mod: RT

## 2025-02-13 ENCOUNTER — OFFICE VISIT (OUTPATIENT)
Dept: URGENT CARE | Facility: PHYSICIAN GROUP | Age: 45
End: 2025-02-13
Payer: COMMERCIAL

## 2025-02-13 ENCOUNTER — APPOINTMENT (OUTPATIENT)
Dept: RADIOLOGY | Facility: IMAGING CENTER | Age: 45
End: 2025-02-13
Attending: PHYSICIAN ASSISTANT
Payer: COMMERCIAL

## 2025-02-13 VITALS
HEIGHT: 65 IN | BODY MASS INDEX: 38.75 KG/M2 | HEART RATE: 80 BPM | SYSTOLIC BLOOD PRESSURE: 128 MMHG | DIASTOLIC BLOOD PRESSURE: 70 MMHG | OXYGEN SATURATION: 97 % | WEIGHT: 232.6 LBS | TEMPERATURE: 98 F | RESPIRATION RATE: 20 BRPM

## 2025-02-13 DIAGNOSIS — M54.41 ACUTE RIGHT-SIDED LOW BACK PAIN WITH RIGHT-SIDED SCIATICA: ICD-10-CM

## 2025-02-13 PROCEDURE — 99214 OFFICE O/P EST MOD 30 MIN: CPT | Mod: 25

## 2025-02-13 PROCEDURE — 72100 X-RAY EXAM L-S SPINE 2/3 VWS: CPT | Mod: TC,FY | Performed by: RADIOLOGY

## 2025-02-13 PROCEDURE — 96372 THER/PROPH/DIAG INJ SC/IM: CPT

## 2025-02-13 PROCEDURE — 72170 X-RAY EXAM OF PELVIS: CPT | Mod: TC,FY | Performed by: RADIOLOGY

## 2025-02-13 RX ORDER — PREDNISONE 20 MG/1
40 TABLET ORAL DAILY
Qty: 10 TABLET | Refills: 0 | Status: SHIPPED | OUTPATIENT
Start: 2025-02-13 | End: 2025-02-18

## 2025-02-13 RX ORDER — KETOROLAC TROMETHAMINE 15 MG/ML
15 INJECTION, SOLUTION INTRAMUSCULAR; INTRAVENOUS ONCE
Status: COMPLETED | OUTPATIENT
Start: 2025-02-13 | End: 2025-02-13

## 2025-02-13 RX ADMIN — KETOROLAC TROMETHAMINE 15 MG: 15 INJECTION, SOLUTION INTRAMUSCULAR; INTRAVENOUS at 12:51

## 2025-02-13 ASSESSMENT — FIBROSIS 4 INDEX: FIB4 SCORE: .4583333333333333333

## 2025-02-13 NOTE — PROGRESS NOTES
Subjective:   Virginia Levy is a 45 y.o. female who presents for Fall (Slipped on ice this morning in driveway, pt having extreme pain in lower back and down the right leg )        Patient presents with concerns of right lower back and pelvic pain that began this morning.  Patient slipped in her driveway on ice and directly impacted the areas on the ground.  Patient felt a slight pop following the incident.  Throughout the day symptoms have been worsening.  Pain radiates down the back of the right leg and is exacerbated by movement and walking.  Rest somewhat alleviates symptoms.  Denies prior injury to the back/surgery.  She took ibuprofen at approximately 5:30 AM with mild symptomatic relief.  No saddle dysesthesias, loss of bowel or bladder control, lower extremity weakness.      ROS    PMH:  has a past medical history of Endometriosis (2008).    She has no past medical history of Hyperlipidemia or Hypertension.  MEDS:   Current Outpatient Medications:     predniSONE (DELTASONE) 20 MG Tab, Take 2 Tablets by mouth every day for 5 days., Disp: 10 Tablet, Rfl: 0    tizanidine (ZANAFLEX) 4 MG Tab, Take 0.5-1 Tablets by mouth every 6 hours as needed (pain and spasm)., Disp: 15 Tablet, Rfl: 0    acetaminophen (TYLENOL) 500 MG Tab, Take 500-1,000 mg by mouth every 6 hours as needed., Disp: , Rfl:     ibuprofen (MOTRIN) 800 MG Tab, Take 800 mg by mouth every 8 hours as needed., Disp: , Rfl:   ALLERGIES:   Allergies   Allergen Reactions    Bee      SURGHX:   Past Surgical History:   Procedure Laterality Date    PRIMARY C SECTION  04/13/2009    TUBAL LIGATION Bilateral 04/13/2009     SOCHX:  reports that she has been smoking cigarettes. She started smoking about 28 years ago. She has a 7 pack-year smoking history. She has never used smokeless tobacco. She reports that she does not currently use alcohol. She reports current drug use. Drug: Marijuana.  FH: Family history was reviewed, no pertinent findings to  "report   Objective:   /70   Pulse 80   Temp 36.7 °C (98 °F) (Temporal)   Resp 20   Ht 1.659 m (5' 5.3\")   Wt 106 kg (232 lb 9.6 oz)   SpO2 97%   BMI 38.35 kg/m²   Physical Exam  Vitals reviewed.   Constitutional:       General: She is not in acute distress.     Appearance: Normal appearance. She is well-developed. She is not toxic-appearing.   HENT:      Head: Normocephalic and atraumatic.      Right Ear: External ear normal.      Left Ear: External ear normal.      Nose: Nose normal.   Cardiovascular:      Rate and Rhythm: Normal rate and regular rhythm.   Pulmonary:      Effort: Pulmonary effort is normal. No respiratory distress.      Breath sounds: No stridor.   Musculoskeletal:      Comments: Tender to palpation right paralumbar muscles, right quadratus lumborum and right upper posterior pelvis/right SI joint.  No palpable step-offs or deformities.  Resisted hip, knee, ankle flexion and extension 5 out of 5 bilaterally.  Lower extremity sensation intact and even bilaterally.   Skin:     General: Skin is dry.   Neurological:      Comments: Alert and oriented.    Psychiatric:         Speech: Speech normal.         Behavior: Behavior normal.         RADIOLOGY RESULTS   DX-PELVIS-1 OR 2 VIEWS  Result Date: 2/13/2025 2/13/2025 12:39 PM HISTORY/REASON FOR EXAM:  Pelvic pain. Ground-level fall.. TECHNIQUE/EXAM DESCRIPTION AND NUMBER OF VIEWS:  1 view(s) of the pelvis. COMPARISON:  None. FINDINGS: No evidence of pelvic or proximal femoral fracture. There are small areas of ossification of the acetabular labrum.     No evidence of fracture or dislocation.    DX-LUMBAR SPINE-2 OR 3 VIEWS  Result Date: 2/13/2025 2/13/2025 12:39 PM HISTORY/REASON FOR EXAM: Back pain. TECHNIQUE/ EXAM DESCRIPTION AND NUMBER OF VIEWS: 3 views of the lumbar spine. COMPARISON: None. FINDINGS: Vertebral body height is well maintained. There is no evidence of fracture. Vertebral alignment is normal. There is degenerative disc " disease characterized by endplate spurring at T12-L1 and T11-T12.     1.  Degenerative disc disease involving the thoracolumbar junction. 2.  No evidence of fracture.    MA-DIAGNOSTIC MAMMO BILAT W/TOMOSYNTHESIS W/O CAD  Result Date: 2/5/2025 2/5/2025 8:59 AM HISTORY/REASON FOR EXAM:  Heterogeneous tissue with shadowing in retroareolar area of the breast on screening breast ultrasound TECHNIQUE/EXAM DESCRIPTION AND NUMBER OF VIEWS: Bilateral tomosynthesis diagnostic mammography was performed with standard mammographic images generated from the data set. Bilateral ultrasound limited COMPARISON:   01/16/2025 FINDINGS: The breasts are heterogeneously dense, which may obscure small masses. No dominant mass or abnormal calcification or architectural distortion is identified. Bilateral breast ultrasound limited demonstrates no evidence of solid mass retroareolar periareolar or anterior regions of the right or left breast. No cyst or fluid collection or abnormal shadowing is identified.     1.  No mammographic or sonographic evidence of malignancy. 2.  Recommend annual screening mammography. These results were given to the patient at the time of visit. BI-RADS Category: R1 -  CATEGORY 1: NEGATIVE Ten to twenty percent of all cancers can be categorized as hereditary and the clinical and financial value of identifying patients and families at risk is well documented. If you have a personal or family history of breast, ovarian, fallopian tube, peritoneal or other cancer, please consult your physician regarding genetic counseling and testing. [facility] [employer] [place] [facility] is offering no cost genetic screening for hereditary breast and ovarian cancer. For more information, discuss with your provider, sign-up through [employer], or visit [email or web address] to learn more.    US-BREAST BILAT-LIMITED  Result Date: 2/5/2025 2/5/2025 8:59 AM HISTORY/REASON FOR EXAM:  Heterogeneous tissue with shadowing in retroareolar area of  the breast on screening breast ultrasound TECHNIQUE/EXAM DESCRIPTION AND NUMBER OF VIEWS: Bilateral tomosynthesis diagnostic mammography was performed with standard mammographic images generated from the data set. Bilateral ultrasound limited COMPARISON:   01/16/2025 FINDINGS: The breasts are heterogeneously dense, which may obscure small masses. No dominant mass or abnormal calcification or architectural distortion is identified. Bilateral breast ultrasound limited demonstrates no evidence of solid mass retroareolar periareolar or anterior regions of the right or left breast. No cyst or fluid collection or abnormal shadowing is identified.     1.  No mammographic or sonographic evidence of malignancy. 2.  Recommend annual screening mammography. These results were given to the patient at the time of visit. BI-RADS Category: R1 -  CATEGORY 1: NEGATIVE Ten to twenty percent of all cancers can be categorized as hereditary and the clinical and financial value of identifying patients and families at risk is well documented. If you have a personal or family history of breast, ovarian, fallopian tube, peritoneal or other cancer, please consult your physician regarding genetic counseling and testing. [employer] [place] [employer] is offering no cost genetic screening for hereditary breast and ovarian cancer. For more information, discuss with your provider, sign-up through [employer], or visit [email or web address] to learn more.    US-SCREENING WHOLE BREAST BILATERAL (3D SCREENING)  Result Date: 1/17/2025 1/16/2025 2:57 PM HISTORY/REASON FOR EXAM:  Automated whole breast screening ultrasound. TECHNIQUE/EXAM DESCRIPTION AND NUMBER OF VIEWS: Screening breast ultrasound of both breasts performed using [employer] to include imaging of all four quadrants and the retroareolar regions bilaterally with comprehensive full-field 3D volumetric imaging and anatomical coronal view. COMPARISON:   None FINDINGS: Right breast:  Parenchymal pattern:  The breast shows heterogeneous background echotexture, which may obscure small masses. There is heterogeneous tissue with shadowing the retroareolar region. Left breast: Parenchymal pattern:  The breast shows heterogeneous background echotexture, which may obscure small masses. There is heterogeneous tissue with shadowing in the retroareolar region.     Heterogeneous tissue and shadowing in bilateral retroareolar regions. Recommend further evaluation with diagnostic mammogram and ultrasound. BI-RADS Category: R0 -  CATEGORY 0: NEED ADDITIONAL IMAGING EVALUATION AND/OR PRIOR MAMMOGRAM FOR COMPARISON                   Assessment/Plan:   1. Acute right-sided low back pain with right-sided sciatica  - ketorolac (Toradol) 15 MG/ML injection 15 mg  - DX-PELVIS-1 OR 2 VIEWS; Future  - DX-LUMBAR SPINE-2 OR 3 VIEWS; Future  - predniSONE (DELTASONE) 20 MG Tab; Take 2 Tablets by mouth every day for 5 days.  Dispense: 10 Tablet; Refill: 0  - tizanidine (ZANAFLEX) 4 MG Tab; Take 0.5-1 Tablets by mouth every 6 hours as needed (pain and spasm).  Dispense: 15 Tablet; Refill: 0    Fortunately there is no evidence of acute bony injury on imaging.  Patient advised that symptoms most likely secondary to muscle strain.    Patient instructed to rest and avoid aggravating activities.  Apply warm, damp heat to the affected area and perform gentle stretches as instructed in clinic.  As symptoms improve patient may increase activity.    Muscle relaxers as needed for muscle spasm.  Patient was advised not to drive, operate machinery, or drink alcohol taking this medication.  Begin burst of prednisone as prescribed.  Patient advised that she should not take NSAIDs concurrently.      If symptoms worsen or fail to improve patient instructed to follow-up with PCP or return to clinic for reevaluation.  If patient develops red flag symptoms such as urinary retention, loss of bowel or bladder control, perianal numbness or  tingling, lower extremity weakness-patient was instructed to go to the ED for further evaluation.

## 2025-02-13 NOTE — LETTER
February 13, 2025    To Whom It May Concern:         This is confirmation that Virginia Fnay Levy attended her scheduled appointment with Michael Flores P.A.-C. on 2/13/25.  Please excuse her from work on 2/13 through 2/14/2025.         If you have any questions please do not hesitate to call me at the phone number listed below.    Sincerely,          Michael Flores P.A.-C.  [phone]

## 2025-08-19 ENCOUNTER — OFFICE VISIT (OUTPATIENT)
Dept: URGENT CARE | Facility: PHYSICIAN GROUP | Age: 45
End: 2025-08-19
Payer: COMMERCIAL

## 2025-08-19 VITALS
BODY MASS INDEX: 37.32 KG/M2 | DIASTOLIC BLOOD PRESSURE: 64 MMHG | WEIGHT: 224 LBS | RESPIRATION RATE: 18 BRPM | HEIGHT: 65 IN | TEMPERATURE: 97.1 F | OXYGEN SATURATION: 99 % | HEART RATE: 77 BPM | SYSTOLIC BLOOD PRESSURE: 120 MMHG

## 2025-08-19 DIAGNOSIS — M25.561 ACUTE PAIN OF RIGHT KNEE: ICD-10-CM

## 2025-08-19 DIAGNOSIS — N30.01 ACUTE CYSTITIS WITH HEMATURIA: Primary | ICD-10-CM

## 2025-08-19 LAB
APPEARANCE UR: NORMAL
BILIRUB UR STRIP-MCNC: NORMAL MG/DL
COLOR UR AUTO: YELLOW
GLUCOSE UR STRIP.AUTO-MCNC: NORMAL MG/DL
KETONES UR STRIP.AUTO-MCNC: NORMAL MG/DL
LEUKOCYTE ESTERASE UR QL STRIP.AUTO: NORMAL
NITRITE UR QL STRIP.AUTO: POSITIVE
PH UR STRIP.AUTO: 5.5 [PH] (ref 5–8)
POCT INT CON NEG: NEGATIVE
POCT INT CON POS: POSITIVE
POCT URINE PREGNANCY TEST: NEGATIVE
PROT UR QL STRIP: NORMAL MG/DL
RBC UR QL AUTO: NORMAL
SP GR UR STRIP.AUTO: >=1.03
UROBILINOGEN UR STRIP-MCNC: 1 MG/DL

## 2025-08-19 PROCEDURE — 99213 OFFICE O/P EST LOW 20 MIN: CPT | Performed by: PHYSICIAN ASSISTANT

## 2025-08-19 PROCEDURE — 3078F DIAST BP <80 MM HG: CPT | Performed by: PHYSICIAN ASSISTANT

## 2025-08-19 PROCEDURE — 81002 URINALYSIS NONAUTO W/O SCOPE: CPT | Performed by: PHYSICIAN ASSISTANT

## 2025-08-19 PROCEDURE — 3074F SYST BP LT 130 MM HG: CPT | Performed by: PHYSICIAN ASSISTANT

## 2025-08-19 PROCEDURE — 81025 URINE PREGNANCY TEST: CPT | Performed by: PHYSICIAN ASSISTANT

## 2025-08-19 RX ORDER — CEFDINIR 300 MG/1
300 CAPSULE ORAL 2 TIMES DAILY
Qty: 14 CAPSULE | Refills: 0 | Status: SHIPPED | OUTPATIENT
Start: 2025-08-19 | End: 2025-08-26

## 2025-08-19 ASSESSMENT — ENCOUNTER SYMPTOMS
CARDIOVASCULAR NEGATIVE: 1
DIARRHEA: 0
FLANK PAIN: 0
VOMITING: 0
ABDOMINAL PAIN: 0
TINGLING: 0
RESPIRATORY NEGATIVE: 1
WEAKNESS: 0
CONSTITUTIONAL NEGATIVE: 1
NAUSEA: 0

## 2025-08-28 ENCOUNTER — OFFICE VISIT (OUTPATIENT)
Dept: SPORTS MEDICINE | Facility: OTHER | Age: 45
End: 2025-08-28
Payer: COMMERCIAL

## 2025-08-28 ENCOUNTER — APPOINTMENT (OUTPATIENT)
Dept: RADIOLOGY | Facility: OTHER | Age: 45
End: 2025-08-28
Attending: FAMILY MEDICINE
Payer: COMMERCIAL

## 2025-08-28 ENCOUNTER — TELEPHONE (OUTPATIENT)
Dept: HEALTH INFORMATION MANAGEMENT | Facility: OTHER | Age: 45
End: 2025-08-28

## 2025-08-28 VITALS
WEIGHT: 224 LBS | OXYGEN SATURATION: 98 % | TEMPERATURE: 98.6 F | BODY MASS INDEX: 37.32 KG/M2 | HEART RATE: 103 BPM | SYSTOLIC BLOOD PRESSURE: 130 MMHG | RESPIRATION RATE: 18 BRPM | HEIGHT: 65 IN | DIASTOLIC BLOOD PRESSURE: 86 MMHG

## 2025-08-28 DIAGNOSIS — M25.461 KNEE EFFUSION, RIGHT: ICD-10-CM

## 2025-08-28 DIAGNOSIS — M23.91 INTERNAL DERANGEMENT OF KNEE, RIGHT: Primary | ICD-10-CM

## 2025-08-28 DIAGNOSIS — M23.91 INTERNAL DERANGEMENT OF KNEE, RIGHT: ICD-10-CM

## 2025-08-28 DIAGNOSIS — M23.91 KNEE LOCKING, RIGHT: ICD-10-CM
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
## 2025-08-28 PROCEDURE — 99214 OFFICE O/P EST MOD 30 MIN: CPT | Performed by: FAMILY MEDICINE

## 2025-08-28 PROCEDURE — 3079F DIAST BP 80-89 MM HG: CPT | Performed by: FAMILY MEDICINE

## 2025-08-28 PROCEDURE — 3075F SYST BP GE 130 - 139MM HG: CPT | Performed by: FAMILY MEDICINE

## 2025-08-28 SDOH — ECONOMIC STABILITY: TRANSPORTATION INSECURITY
IN THE PAST 12 MONTHS, HAS LACK OF TRANSPORTATION KEPT YOU FROM MEETINGS, WORK, OR FROM GETTING THINGS NEEDED FOR DAILY LIVING?: NO

## 2025-08-28 SDOH — HEALTH STABILITY: PHYSICAL HEALTH: ON AVERAGE, HOW MANY DAYS PER WEEK DO YOU ENGAGE IN MODERATE TO STRENUOUS EXERCISE (LIKE A BRISK WALK)?: 5 DAYS

## 2025-08-28 SDOH — ECONOMIC STABILITY: HOUSING INSECURITY
IN THE LAST 12 MONTHS, WAS THERE A TIME WHEN YOU DID NOT HAVE A STEADY PLACE TO SLEEP OR SLEPT IN A SHELTER (INCLUDING NOW)?: NO

## 2025-08-28 SDOH — HEALTH STABILITY: MENTAL HEALTH
STRESS IS WHEN SOMEONE FEELS TENSE, NERVOUS, ANXIOUS, OR CAN'T SLEEP AT NIGHT BECAUSE THEIR MIND IS TROUBLED. HOW STRESSED ARE YOU?: ONLY A LITTLE

## 2025-08-28 SDOH — ECONOMIC STABILITY: INCOME INSECURITY: IN THE LAST 12 MONTHS, WAS THERE A TIME WHEN YOU WERE NOT ABLE TO PAY THE MORTGAGE OR RENT ON TIME?: NO

## 2025-08-28 SDOH — ECONOMIC STABILITY: TRANSPORTATION INSECURITY
IN THE PAST 12 MONTHS, HAS THE LACK OF TRANSPORTATION KEPT YOU FROM MEDICAL APPOINTMENTS OR FROM GETTING MEDICATIONS?: NO

## 2025-08-28 SDOH — ECONOMIC STABILITY: FOOD INSECURITY: WITHIN THE PAST 12 MONTHS, YOU WORRIED THAT YOUR FOOD WOULD RUN OUT BEFORE YOU GOT MONEY TO BUY MORE.: NEVER TRUE

## 2025-08-28 SDOH — ECONOMIC STABILITY: FOOD INSECURITY: WITHIN THE PAST 12 MONTHS, THE FOOD YOU BOUGHT JUST DIDN'T LAST AND YOU DIDN'T HAVE MONEY TO GET MORE.: NEVER TRUE

## 2025-08-28 SDOH — ECONOMIC STABILITY: TRANSPORTATION INSECURITY
IN THE PAST 12 MONTHS, HAS LACK OF RELIABLE TRANSPORTATION KEPT YOU FROM MEDICAL APPOINTMENTS, MEETINGS, WORK OR FROM GETTING THINGS NEEDED FOR DAILY LIVING?: NO

## 2025-08-28 SDOH — HEALTH STABILITY: PHYSICAL HEALTH: ON AVERAGE, HOW MANY MINUTES DO YOU ENGAGE IN EXERCISE AT THIS LEVEL?: 120 MIN

## 2025-08-28 SDOH — ECONOMIC STABILITY: INCOME INSECURITY: HOW HARD IS IT FOR YOU TO PAY FOR THE VERY BASICS LIKE FOOD, HOUSING, MEDICAL CARE, AND HEATING?: NOT HARD AT ALL

## 2025-08-28 ASSESSMENT — SOCIAL DETERMINANTS OF HEALTH (SDOH)
HOW OFTEN DO YOU ATTENT MEETINGS OF THE CLUB OR ORGANIZATION YOU BELONG TO?: PATIENT DECLINED
HOW OFTEN DO YOU HAVE A DRINK CONTAINING ALCOHOL: MONTHLY OR LESS
DO YOU BELONG TO ANY CLUBS OR ORGANIZATIONS SUCH AS CHURCH GROUPS UNIONS, FRATERNAL OR ATHLETIC GROUPS, OR SCHOOL GROUPS?: NO
HOW OFTEN DO YOU GET TOGETHER WITH FRIENDS OR RELATIVES?: ONCE A WEEK
IN A TYPICAL WEEK, HOW MANY TIMES DO YOU TALK ON THE PHONE WITH FAMILY, FRIENDS, OR NEIGHBORS?: MORE THAN THREE TIMES A WEEK
IN THE PAST 12 MONTHS, HAS THE ELECTRIC, GAS, OIL, OR WATER COMPANY THREATENED TO SHUT OFF SERVICE IN YOUR HOME?: YES
HOW OFTEN DO YOU ATTEND CHURCH OR RELIGIOUS SERVICES?: MORE THAN 4 TIMES PER YEAR
DO YOU BELONG TO ANY CLUBS OR ORGANIZATIONS SUCH AS CHURCH GROUPS UNIONS, FRATERNAL OR ATHLETIC GROUPS, OR SCHOOL GROUPS?: NO
IN A TYPICAL WEEK, HOW MANY TIMES DO YOU TALK ON THE PHONE WITH FAMILY, FRIENDS, OR NEIGHBORS?: MORE THAN THREE TIMES A WEEK
HOW HARD IS IT FOR YOU TO PAY FOR THE VERY BASICS LIKE FOOD, HOUSING, MEDICAL CARE, AND HEATING?: NOT HARD AT ALL
HOW OFTEN DO YOU GET TOGETHER WITH FRIENDS OR RELATIVES?: ONCE A WEEK
HOW OFTEN DO YOU ATTENT MEETINGS OF THE CLUB OR ORGANIZATION YOU BELONG TO?: PATIENT DECLINED
HOW OFTEN DO YOU HAVE SIX OR MORE DRINKS ON ONE OCCASION: LESS THAN MONTHLY
HOW OFTEN DO YOU ATTEND CHURCH OR RELIGIOUS SERVICES?: MORE THAN 4 TIMES PER YEAR
HOW MANY DRINKS CONTAINING ALCOHOL DO YOU HAVE ON A TYPICAL DAY WHEN YOU ARE DRINKING: 1 OR 2
WITHIN THE PAST 12 MONTHS, YOU WORRIED THAT YOUR FOOD WOULD RUN OUT BEFORE YOU GOT THE MONEY TO BUY MORE: NEVER TRUE

## 2025-08-28 ASSESSMENT — LIFESTYLE VARIABLES
AUDIT-C TOTAL SCORE: 2
HOW OFTEN DO YOU HAVE A DRINK CONTAINING ALCOHOL: MONTHLY OR LESS
HOW MANY STANDARD DRINKS CONTAINING ALCOHOL DO YOU HAVE ON A TYPICAL DAY: 1 OR 2
HOW OFTEN DO YOU HAVE SIX OR MORE DRINKS ON ONE OCCASION: LESS THAN MONTHLY
SKIP TO QUESTIONS 9-10: 0